# Patient Record
Sex: FEMALE | Race: WHITE | NOT HISPANIC OR LATINO | Employment: STUDENT | URBAN - METROPOLITAN AREA
[De-identification: names, ages, dates, MRNs, and addresses within clinical notes are randomized per-mention and may not be internally consistent; named-entity substitution may affect disease eponyms.]

---

## 2024-05-28 ENCOUNTER — TELEPHONE (OUTPATIENT)
Dept: OBSTETRICS AND GYNECOLOGY | Facility: CLINIC | Age: 19
End: 2024-05-28
Payer: MEDICAID

## 2024-05-28 NOTE — TELEPHONE ENCOUNTER
----- Message from Fran Russell sent at 5/28/2024 11:49 AM CDT -----  Contact: Pt  .Type:  Needs Medical Advice    Who Called: pt  Would the patient rather a call back or a response via [facility]?  Call back  Best Call Back Number: [phone]  Additional Information: pt. Would like to establish care with the provider.

## 2024-05-31 ENCOUNTER — TELEPHONE (OUTPATIENT)
Dept: OBSTETRICS AND GYNECOLOGY | Facility: CLINIC | Age: 19
End: 2024-05-31
Payer: MEDICAID

## 2024-05-31 NOTE — TELEPHONE ENCOUNTER
----- Message from Jennifer Painter sent at 5/28/2024  2:19 PM CDT -----  Type:  OB APPT     Who Called:np  Does the patient know what this is regarding?:est care for 1st ob visit  Pt is currently 10 weeks pregnant  Pt was seen at a parent planning center she did an US there (05/08/2024)  Last cycle 03/16/2024  Would the patient rather a call back or a response via [facility]? Call   Best Call Back Number: [phone]  Additional Information: Prefers a female and would like to deliver in [place]

## 2024-05-31 NOTE — TELEPHONE ENCOUNTER
Returned pt call and helped schedule with ob navigator next week since pt is about 10 weeks pregnant. Pt verbalized understanding.

## 2024-06-04 ENCOUNTER — PATIENT MESSAGE (OUTPATIENT)
Dept: OBSTETRICS AND GYNECOLOGY | Facility: CLINIC | Age: 19
End: 2024-06-04

## 2024-06-04 ENCOUNTER — CLINICAL SUPPORT (OUTPATIENT)
Dept: OBSTETRICS AND GYNECOLOGY | Facility: CLINIC | Age: 19
End: 2024-06-04
Payer: MEDICAID

## 2024-06-04 DIAGNOSIS — A74.9 CHLAMYDIA: Primary | ICD-10-CM

## 2024-06-04 DIAGNOSIS — N91.2 AMENORRHEA: Primary | ICD-10-CM

## 2024-06-04 PROCEDURE — 99211 OFF/OP EST MAY X REQ PHY/QHP: CPT | Mod: PBBFAC,PO

## 2024-06-04 PROCEDURE — 99999 PR PBB SHADOW E&M-EST. PATIENT-LVL I: CPT | Mod: PBBFAC,,,

## 2024-06-04 NOTE — PROGRESS NOTES
Spoke with patient for a total of 30 minutes during virtual visit.  Updated chart to reflect up to date patient demographics.  Allergies, medications, pharmacy, medical/family history and OB history updated.  Patient was guided through expectations of care during pregnancy.  Pregnancy confirmation, dating u/s & first routine OB appts scheduled.  Education provided & questions answered. Encouraged to send message or call office with any questions/concerns. Verbalized understanding.     Discussed with pt:    taking PNV   C/o occ n/v  C/o cramping L side/no spotting  Precautions discussed  Referred to [email or web address] for Preg A to Z guide & class schedule   Discussed benefits of breastfeeding  Discussed need for pediatrician  New pt-requested female, 1st avail  [facility] ~ 7wks with + cardiac activity  Asking about paternity testing

## 2024-06-05 ENCOUNTER — LAB VISIT (OUTPATIENT)
Dept: LAB | Facility: HOSPITAL | Age: 19
End: 2024-06-05
Attending: STUDENT IN AN ORGANIZED HEALTH CARE EDUCATION/TRAINING PROGRAM
Payer: MEDICAID

## 2024-06-05 ENCOUNTER — PROCEDURE VISIT (OUTPATIENT)
Dept: MATERNAL FETAL MEDICINE | Facility: CLINIC | Age: 19
End: 2024-06-05
Payer: MEDICAID

## 2024-06-05 ENCOUNTER — OFFICE VISIT (OUTPATIENT)
Dept: OBSTETRICS AND GYNECOLOGY | Facility: CLINIC | Age: 19
End: 2024-06-05
Payer: MEDICAID

## 2024-06-05 VITALS — HEART RATE: 102 BPM | WEIGHT: 112.88 LBS | DIASTOLIC BLOOD PRESSURE: 64 MMHG | SYSTOLIC BLOOD PRESSURE: 99 MMHG

## 2024-06-05 DIAGNOSIS — N91.2 AMENORRHEA: ICD-10-CM

## 2024-06-05 DIAGNOSIS — O21.9 NAUSEA AND VOMITING DURING PREGNANCY: ICD-10-CM

## 2024-06-05 DIAGNOSIS — Z3A.12 12 WEEKS GESTATION OF PREGNANCY: ICD-10-CM

## 2024-06-05 DIAGNOSIS — N91.2 AMENORRHEA: Primary | ICD-10-CM

## 2024-06-05 LAB
ABO + RH BLD: NORMAL
BASOPHILS # BLD AUTO: 0.04 K/UL (ref 0–0.2)
BASOPHILS NFR BLD: 0.6 % (ref 0–1.9)
BLD GP AB SCN CELLS X3 SERPL QL: NORMAL
DIFFERENTIAL METHOD BLD: ABNORMAL
EOSINOPHIL # BLD AUTO: 0.2 K/UL (ref 0–0.5)
EOSINOPHIL NFR BLD: 3.4 % (ref 0–8)
ERYTHROCYTE [DISTWIDTH] IN BLOOD BY AUTOMATED COUNT: 12.6 % (ref 11.5–14.5)
HBV SURFACE AG SERPL QL IA: NORMAL
HCT VFR BLD AUTO: 36.8 % (ref 37–48.5)
HCV AB SERPL QL IA: NORMAL
HGB BLD-MCNC: 12.3 G/DL (ref 12–16)
HIV 1+2 AB+HIV1 P24 AG SERPL QL IA: NORMAL
IMM GRANULOCYTES # BLD AUTO: 0.03 K/UL (ref 0–0.04)
IMM GRANULOCYTES NFR BLD AUTO: 0.5 % (ref 0–0.5)
LYMPHOCYTES # BLD AUTO: 2.3 K/UL (ref 1–4.8)
LYMPHOCYTES NFR BLD: 36.5 % (ref 18–48)
MCH RBC QN AUTO: 27.4 PG (ref 27–31)
MCHC RBC AUTO-ENTMCNC: 33.4 G/DL (ref 32–36)
MCV RBC AUTO: 82 FL (ref 82–98)
MONOCYTES # BLD AUTO: 0.6 K/UL (ref 0.3–1)
MONOCYTES NFR BLD: 9.7 % (ref 4–15)
NEUTROPHILS # BLD AUTO: 3 K/UL (ref 1.8–7.7)
NEUTROPHILS NFR BLD: 49.3 % (ref 38–73)
NRBC BLD-RTO: 0 /100 WBC
PLATELET # BLD AUTO: 198 K/UL (ref 150–450)
PMV BLD AUTO: 11 FL (ref 9.2–12.9)
RBC # BLD AUTO: 4.49 M/UL (ref 4–5.4)
SPECIMEN OUTDATE: NORMAL
TREPONEMA PALLIDUM IGG+IGM AB [PRESENCE] IN SERUM OR PLASMA BY IMMUNOASSAY: NONREACTIVE
WBC # BLD AUTO: 6.17 K/UL (ref 3.9–12.7)

## 2024-06-05 PROCEDURE — 87340 HEPATITIS B SURFACE AG IA: CPT | Performed by: STUDENT IN AN ORGANIZED HEALTH CARE EDUCATION/TRAINING PROGRAM

## 2024-06-05 PROCEDURE — 86901 BLOOD TYPING SEROLOGIC RH(D): CPT | Performed by: STUDENT IN AN ORGANIZED HEALTH CARE EDUCATION/TRAINING PROGRAM

## 2024-06-05 PROCEDURE — 87077 CULTURE AEROBIC IDENTIFY: CPT | Performed by: STUDENT IN AN ORGANIZED HEALTH CARE EDUCATION/TRAINING PROGRAM

## 2024-06-05 PROCEDURE — 81514 NFCT DS BV&VAGINITIS DNA ALG: CPT | Performed by: STUDENT IN AN ORGANIZED HEALTH CARE EDUCATION/TRAINING PROGRAM

## 2024-06-05 PROCEDURE — 81220 CFTR GENE COM VARIANTS: CPT | Performed by: STUDENT IN AN ORGANIZED HEALTH CARE EDUCATION/TRAINING PROGRAM

## 2024-06-05 PROCEDURE — 87389 HIV-1 AG W/HIV-1&-2 AB AG IA: CPT | Performed by: STUDENT IN AN ORGANIZED HEALTH CARE EDUCATION/TRAINING PROGRAM

## 2024-06-05 PROCEDURE — 99204 OFFICE O/P NEW MOD 45 MIN: CPT | Mod: S$PBB,TH,, | Performed by: STUDENT IN AN ORGANIZED HEALTH CARE EDUCATION/TRAINING PROGRAM

## 2024-06-05 PROCEDURE — 87491 CHLMYD TRACH DNA AMP PROBE: CPT | Performed by: STUDENT IN AN ORGANIZED HEALTH CARE EDUCATION/TRAINING PROGRAM

## 2024-06-05 PROCEDURE — 3078F DIAST BP <80 MM HG: CPT | Mod: CPTII,,, | Performed by: STUDENT IN AN ORGANIZED HEALTH CARE EDUCATION/TRAINING PROGRAM

## 2024-06-05 PROCEDURE — 87591 N.GONORRHOEAE DNA AMP PROB: CPT | Performed by: STUDENT IN AN ORGANIZED HEALTH CARE EDUCATION/TRAINING PROGRAM

## 2024-06-05 PROCEDURE — 83020 HEMOGLOBIN ELECTROPHORESIS: CPT | Performed by: STUDENT IN AN ORGANIZED HEALTH CARE EDUCATION/TRAINING PROGRAM

## 2024-06-05 PROCEDURE — 3074F SYST BP LT 130 MM HG: CPT | Mod: CPTII,,, | Performed by: STUDENT IN AN ORGANIZED HEALTH CARE EDUCATION/TRAINING PROGRAM

## 2024-06-05 PROCEDURE — 1159F MED LIST DOCD IN RCRD: CPT | Mod: CPTII,,, | Performed by: STUDENT IN AN ORGANIZED HEALTH CARE EDUCATION/TRAINING PROGRAM

## 2024-06-05 PROCEDURE — 87186 SC STD MICRODIL/AGAR DIL: CPT | Performed by: STUDENT IN AN ORGANIZED HEALTH CARE EDUCATION/TRAINING PROGRAM

## 2024-06-05 PROCEDURE — 99999 PR PBB SHADOW E&M-EST. PATIENT-LVL II: CPT | Mod: PBBFAC,,, | Performed by: STUDENT IN AN ORGANIZED HEALTH CARE EDUCATION/TRAINING PROGRAM

## 2024-06-05 PROCEDURE — 86593 SYPHILIS TEST NON-TREP QUANT: CPT | Performed by: STUDENT IN AN ORGANIZED HEALTH CARE EDUCATION/TRAINING PROGRAM

## 2024-06-05 PROCEDURE — 87088 URINE BACTERIA CULTURE: CPT | Performed by: STUDENT IN AN ORGANIZED HEALTH CARE EDUCATION/TRAINING PROGRAM

## 2024-06-05 PROCEDURE — 86762 RUBELLA ANTIBODY: CPT | Performed by: STUDENT IN AN ORGANIZED HEALTH CARE EDUCATION/TRAINING PROGRAM

## 2024-06-05 PROCEDURE — 99212 OFFICE O/P EST SF 10 MIN: CPT | Mod: PBBFAC,TH,PO,25 | Performed by: STUDENT IN AN ORGANIZED HEALTH CARE EDUCATION/TRAINING PROGRAM

## 2024-06-05 PROCEDURE — 86803 HEPATITIS C AB TEST: CPT | Performed by: STUDENT IN AN ORGANIZED HEALTH CARE EDUCATION/TRAINING PROGRAM

## 2024-06-05 PROCEDURE — 87086 URINE CULTURE/COLONY COUNT: CPT | Performed by: STUDENT IN AN ORGANIZED HEALTH CARE EDUCATION/TRAINING PROGRAM

## 2024-06-05 PROCEDURE — 36415 COLL VENOUS BLD VENIPUNCTURE: CPT | Performed by: STUDENT IN AN ORGANIZED HEALTH CARE EDUCATION/TRAINING PROGRAM

## 2024-06-05 PROCEDURE — 85025 COMPLETE CBC W/AUTO DIFF WBC: CPT | Performed by: STUDENT IN AN ORGANIZED HEALTH CARE EDUCATION/TRAINING PROGRAM

## 2024-06-05 PROCEDURE — 76801 OB US < 14 WKS SINGLE FETUS: CPT | Mod: PBBFAC,PO | Performed by: OBSTETRICS & GYNECOLOGY

## 2024-06-05 RX ORDER — ONDANSETRON 4 MG/1
4 TABLET, FILM COATED ORAL DAILY PRN
Qty: 30 TABLET | Refills: 1 | Status: SHIPPED | OUTPATIENT
Start: 2024-06-05 | End: 2025-06-05

## 2024-06-05 NOTE — PROGRESS NOTES
Reason for Visit   Routine Prenatal Visit    HPI   19 y.o. female  at 12w0d by Patient's last menstrual period was 2024 (exact date). and dating US today (GERI 24) presents for initial OB appointment. Patient feels well this pregnancy, reports no major issues/concerns.     Nausea:  Yes  Vomiting: No  Cramping: Yes  Bleeding:  No    Family history of bleeding disorders, birth defects, or mental disability: DENIES  Complications with prior pregnancy: N/A    Pap: No result found, <21  Allergies: Cat/feline products  OB History    Para Term  AB Living   1             SAB IAB Ectopic Multiple Live Births                  # Outcome Date GA Lbr Jean-Claude/2nd Weight Sex Type Anes PTL Lv   1 Current              History reviewed. No pertinent past medical history.   History reviewed. No pertinent surgical history.     ROS:  GENERAL: Denies weight gain or weight loss. Feeling well overall.   SKIN: Denies rash or lesions.   HEAD: Denies head injury or headache.   NODES: Denies enlarged lymph nodes.   CHEST: Denies chest pain or shortness of breath.   CARDIOVASCULAR: Denies palpitations or left sided chest pain.   ABDOMEN: No abdominal pain, constipation, diarrhea, nausea, vomiting or rectal bleeding.   URINARY: No frequency, dysuria, hematuria, or burning on urination.  REPRODUCTIVE: See HPI.   BREASTS: The patient performs breast self-examination and denies pain, lumps, or nipple discharge.   HEMATOLOGIC: No easy bruisability or excessive bleeding.  MUSCULOSKELETAL: Denies joint pain or swelling.   NEUROLOGIC: Denies syncope or weakness.   PSYCHIATRIC: Denies depression, anxiety or mood swings.      Exam   BP 99/64   Pulse 102   Wt 51.2 kg (112 lb 14 oz)   LMP 2024 (Exact Date)     Physical Exam:  APPEARANCE: Well nourished, well developed, in no acute distress.  AFFECT: WNL, alert and oriented x 3  SKIN: No acne or hirsutism  NECK: Neck symmetric without masses or thyromegaly  CHEST: Good  respiratory effect  PELVIC: Normal external genitalia without lesions.  Normal hair distribution.  Adequate perineal body, normal urethral meatus.    EXTREMITIES: No edema.    Assessment and Plan   Amenorrhea  -     Hepatitis C Antibody; Future; Expected date: 06/05/2024  -     HIV 1/2 Ag/Ab (4th Gen); Future; Expected date: 06/05/2024  -     Treponema Pallidium Antibodies IgG, IgM; Future; Expected date: 06/05/2024  -     Hepatitis B surface antigen; Future; Expected date: 06/05/2024  -     Type & Screen; Future; Expected date: 06/05/2024  -     Rubella antibody, IgG; Future; Expected date: 06/05/2024  -     Urine culture  -     CBC auto differential; Future; Expected date: 06/05/2024  -     US OB/GYN Procedure ([facility]); Future  -     C. trachomatis/N. gonorrhoeae by AMP DNA [facility]; Vagina  -     Hemoglobin Electrophoresis,Hgb A2 K[place]hal.; Future; Expected date: 06/05/2024  -     Cystic Fibrosis Mutation Panel; Future; Expected date: 06/05/2024  -     Vaginosis Screen by DNA Probe    12 weeks gestation of pregnancy        - GERI 12/18/24 by 12wk US  - Initial prenatal labs ordered   - Hgb electrophoresis, Carrier screening: ordered  - Aneuploidy screening: discussed options today, patient will consider  - PNV: taking   - ASA: low risk   - will start zofran for nausea     Patient was counseled today on:  - Routine prenatal blood tests including HIV and anticipated course of prenatal care  - Prenatal vitamins and folic acid  - Weight gain, nutrition, and exercise  - Foods to avoid in pregnancy (i.e. sushi, fish that are high in mercury, deli meat, and unpasteurized cheeses).   - Avoiding cat litter and raw meats due to risk of Toxoplasmosis   - Aneuploidy and neural tube screening: cfDNA vs integrated screening, AFP screen at 15 weeks  - Hgb electrophoresis and Carrier screening (CF, SMA) offered, fragile X as indicated by patient history   - OTC medication in the first trimester  - Harmful effects of smoking,  alcohol, and recreational drugs  - [facility] u/s for anatomy at 18-20 weeks.  - Seat belt use  - Pain, bleeding, and ED precautions given.  - All questions were answered          Return to clinic in 4 weeks          Stephanie Heaney, MD OBGYN [facility]

## 2024-06-06 LAB
HGB A2 MFR BLD HPLC: 3 % (ref 2.2–3.2)
HGB FRACT BLD ELPH-IMP: NORMAL
HGB FRACT BLD ELPH-IMP: NORMAL
RUBV IGG SER-ACNC: 34.1 IU/ML
RUBV IGG SER-IMP: REACTIVE

## 2024-06-07 LAB
BACTERIAL VAGINOSIS DNA: POSITIVE
CANDIDA GLABRATA DNA: NEGATIVE
CANDIDA KRUSEI DNA: NEGATIVE
CANDIDA RRNA VAG QL PROBE: NEGATIVE
T VAGINALIS RRNA GENITAL QL PROBE: NEGATIVE

## 2024-06-07 RX ORDER — METRONIDAZOLE 500 MG/1
500 TABLET ORAL EVERY 12 HOURS
Qty: 14 TABLET | Refills: 0 | Status: SHIPPED | OUTPATIENT
Start: 2024-06-07 | End: 2024-06-14

## 2024-06-08 LAB
BACTERIA UR CULT: ABNORMAL
C TRACH DNA SPEC QL NAA+PROBE: DETECTED
N GONORRHOEA DNA SPEC QL NAA+PROBE: NOT DETECTED

## 2024-06-10 DIAGNOSIS — B96.20 E. COLI UTI: Primary | ICD-10-CM

## 2024-06-10 DIAGNOSIS — N39.0 E. COLI UTI: Primary | ICD-10-CM

## 2024-06-10 RX ORDER — AMOXICILLIN AND CLAVULANATE POTASSIUM 875; 125 MG/1; MG/1
1 TABLET, FILM COATED ORAL EVERY 12 HOURS
Qty: 14 TABLET | Refills: 0 | Status: SHIPPED | OUTPATIENT
Start: 2024-06-10 | End: 2024-06-17

## 2024-06-11 ENCOUNTER — TELEPHONE (OUTPATIENT)
Dept: OBSTETRICS AND GYNECOLOGY | Facility: CLINIC | Age: 19
End: 2024-06-11
Payer: MEDICAID

## 2024-06-11 LAB — CFTR MUT ANL BLD/T: NORMAL

## 2024-06-11 RX ORDER — AZITHROMYCIN 500 MG/1
1000 TABLET, FILM COATED ORAL ONCE
Qty: 2 TABLET | Refills: 0 | Status: SHIPPED | OUTPATIENT
Start: 2024-06-11 | End: 2024-06-11

## 2024-06-11 NOTE — TELEPHONE ENCOUNTER
Called patient to review her medication request. Rx sent for positive chlamydia testing and reviewed other results (BV, UTI)

## 2024-06-17 ENCOUNTER — HOSPITAL ENCOUNTER (EMERGENCY)
Facility: HOSPITAL | Age: 19
Discharge: HOME | End: 2024-06-17
Attending: EMERGENCY MEDICINE
Payer: MEDICAID

## 2024-06-17 ENCOUNTER — E-CONSULT (OUTPATIENT)
Dept: OBSTETRICS AND GYNECOLOGY | Facility: CLINIC | Age: 19
End: 2024-06-17
Payer: MEDICAID

## 2024-06-17 VITALS
RESPIRATION RATE: 16 BRPM | BODY MASS INDEX: 21.14 KG/M2 | WEIGHT: 112 LBS | HEIGHT: 61 IN | DIASTOLIC BLOOD PRESSURE: 62 MMHG | SYSTOLIC BLOOD PRESSURE: 108 MMHG | HEART RATE: 78 BPM | OXYGEN SATURATION: 100 % | TEMPERATURE: 98 F

## 2024-06-17 DIAGNOSIS — I95.9 HYPOTENSION: ICD-10-CM

## 2024-06-17 DIAGNOSIS — I95.9 TRANSIENT HYPOTENSION: ICD-10-CM

## 2024-06-17 DIAGNOSIS — R10.9 ABDOMINAL CRAMPING: ICD-10-CM

## 2024-06-17 DIAGNOSIS — O03.9 COMPLETE MISCARRIAGE: Primary | ICD-10-CM

## 2024-06-17 DIAGNOSIS — O26.51 MATERNAL HYPOTENSION SYNDROME IN FIRST TRIMESTER: ICD-10-CM

## 2024-06-17 DIAGNOSIS — O03.9 SAB (SPONTANEOUS ABORTION): Primary | ICD-10-CM

## 2024-06-17 DIAGNOSIS — Z34.91 FIRST TRIMESTER PREGNANCY: ICD-10-CM

## 2024-06-17 LAB
ABO + RH BLD: NORMAL
ALBUMIN SERPL BCP-MCNC: 4.2 G/DL (ref 3.5–5.2)
ALLENS TEST: ABNORMAL
ALP SERPL-CCNC: 39 U/L (ref 55–135)
ALT SERPL W/O P-5'-P-CCNC: 24 U/L (ref 10–44)
ANION GAP SERPL CALC-SCNC: 14 MMOL/L (ref 8–16)
AST SERPL-CCNC: 29 U/L (ref 10–40)
B-HCG UR QL: POSITIVE
BASOPHILS # BLD AUTO: 0.04 K/UL (ref 0–0.2)
BASOPHILS # BLD AUTO: 0.06 K/UL (ref 0–0.2)
BASOPHILS NFR BLD: 0.4 % (ref 0–1.9)
BASOPHILS NFR BLD: 0.4 % (ref 0–1.9)
BILIRUB SERPL-MCNC: 0.3 MG/DL (ref 0.1–1)
BILIRUB UR QL STRIP: NEGATIVE
BUN SERPL-MCNC: 7 MG/DL (ref 6–20)
CALCIUM SERPL-MCNC: 9.9 MG/DL (ref 8.7–10.5)
CHLORIDE SERPL-SCNC: 104 MMOL/L (ref 95–110)
CLARITY UR REFRACT.AUTO: CLEAR
CO2 SERPL-SCNC: 18 MMOL/L (ref 23–29)
COLOR UR AUTO: YELLOW
CREAT SERPL-MCNC: 0.6 MG/DL (ref 0.5–1.4)
CTP QC/QA: YES
DIFFERENTIAL METHOD BLD: ABNORMAL
DIFFERENTIAL METHOD BLD: ABNORMAL
EOSINOPHIL # BLD AUTO: 0.2 K/UL (ref 0–0.5)
EOSINOPHIL # BLD AUTO: 0.3 K/UL (ref 0–0.5)
EOSINOPHIL NFR BLD: 1.5 % (ref 0–8)
EOSINOPHIL NFR BLD: 2.8 % (ref 0–8)
ERYTHROCYTE [DISTWIDTH] IN BLOOD BY AUTOMATED COUNT: 12.7 % (ref 11.5–14.5)
ERYTHROCYTE [DISTWIDTH] IN BLOOD BY AUTOMATED COUNT: 12.9 % (ref 11.5–14.5)
EST. GFR  (NO RACE VARIABLE): >60 ML/MIN/1.73 M^2
GLUCOSE SERPL-MCNC: 82 MG/DL (ref 70–110)
GLUCOSE UR QL STRIP: NEGATIVE
HCT VFR BLD AUTO: 25.2 % (ref 37–48.5)
HCT VFR BLD AUTO: 38 % (ref 37–48.5)
HGB BLD-MCNC: 12.4 G/DL (ref 12–16)
HGB BLD-MCNC: 8.2 G/DL (ref 12–16)
HGB UR QL STRIP: NEGATIVE
IMM GRANULOCYTES # BLD AUTO: 0.02 K/UL (ref 0–0.04)
IMM GRANULOCYTES # BLD AUTO: 0.05 K/UL (ref 0–0.04)
IMM GRANULOCYTES NFR BLD AUTO: 0.2 % (ref 0–0.5)
IMM GRANULOCYTES NFR BLD AUTO: 0.3 % (ref 0–0.5)
KETONES UR QL STRIP: ABNORMAL
LDH SERPL L TO P-CCNC: 0.36 MMOL/L (ref 0.5–2.2)
LEUKOCYTE ESTERASE UR QL STRIP: NEGATIVE
LIPASE SERPL-CCNC: 25 U/L (ref 4–60)
LYMPHOCYTES # BLD AUTO: 3 K/UL (ref 1–4.8)
LYMPHOCYTES # BLD AUTO: 3.3 K/UL (ref 1–4.8)
LYMPHOCYTES NFR BLD: 21.4 % (ref 18–48)
LYMPHOCYTES NFR BLD: 31.7 % (ref 18–48)
MCH RBC QN AUTO: 27.4 PG (ref 27–31)
MCH RBC QN AUTO: 27.4 PG (ref 27–31)
MCHC RBC AUTO-ENTMCNC: 32.5 G/DL (ref 32–36)
MCHC RBC AUTO-ENTMCNC: 32.6 G/DL (ref 32–36)
MCV RBC AUTO: 84 FL (ref 82–98)
MCV RBC AUTO: 84 FL (ref 82–98)
MONOCYTES # BLD AUTO: 0.6 K/UL (ref 0.3–1)
MONOCYTES # BLD AUTO: 0.8 K/UL (ref 0.3–1)
MONOCYTES NFR BLD: 5.2 % (ref 4–15)
MONOCYTES NFR BLD: 6.7 % (ref 4–15)
NEUTROPHILS # BLD AUTO: 11 K/UL (ref 1.8–7.7)
NEUTROPHILS # BLD AUTO: 5.5 K/UL (ref 1.8–7.7)
NEUTROPHILS NFR BLD: 58.2 % (ref 38–73)
NEUTROPHILS NFR BLD: 71.2 % (ref 38–73)
NITRITE UR QL STRIP: NEGATIVE
NRBC BLD-RTO: 0 /100 WBC
NRBC BLD-RTO: 0 /100 WBC
OHS QRS DURATION: 74 MS
OHS QTC CALCULATION: 435 MS
PH UR STRIP: 6 [PH] (ref 5–8)
PLATELET # BLD AUTO: 141 K/UL (ref 150–450)
PLATELET # BLD AUTO: 240 K/UL (ref 150–450)
PMV BLD AUTO: 10.9 FL (ref 9.2–12.9)
PMV BLD AUTO: 11.1 FL (ref 9.2–12.9)
POTASSIUM SERPL-SCNC: 3.6 MMOL/L (ref 3.5–5.1)
PROT SERPL-MCNC: 7.7 G/DL (ref 6–8.4)
PROT UR QL STRIP: NEGATIVE
RBC # BLD AUTO: 2.99 M/UL (ref 4–5.4)
RBC # BLD AUTO: 4.52 M/UL (ref 4–5.4)
SAMPLE: ABNORMAL
SITE: ABNORMAL
SODIUM SERPL-SCNC: 136 MMOL/L (ref 136–145)
SP GR UR STRIP: 1.02 (ref 1–1.03)
URN SPEC COLLECT METH UR: ABNORMAL
WBC # BLD AUTO: 15.52 K/UL (ref 3.9–12.7)
WBC # BLD AUTO: 9.43 K/UL (ref 3.9–12.7)

## 2024-06-17 PROCEDURE — 88300 SURGICAL PATH GROSS: CPT | Performed by: PATHOLOGY

## 2024-06-17 PROCEDURE — 96376 TX/PRO/DX INJ SAME DRUG ADON: CPT

## 2024-06-17 PROCEDURE — 93005 ELECTROCARDIOGRAM TRACING: CPT

## 2024-06-17 PROCEDURE — 99447 NTRPROF PH1/NTRNET/EHR 11-20: CPT | Mod: ,,, | Performed by: OBSTETRICS & GYNECOLOGY

## 2024-06-17 PROCEDURE — 25000003 PHARM REV CODE 250: Performed by: EMERGENCY MEDICINE

## 2024-06-17 PROCEDURE — 63600175 PHARM REV CODE 636 W HCPCS: Performed by: EMERGENCY MEDICINE

## 2024-06-17 PROCEDURE — 96375 TX/PRO/DX INJ NEW DRUG ADDON: CPT

## 2024-06-17 PROCEDURE — 81025 URINE PREGNANCY TEST: CPT | Performed by: EMERGENCY MEDICINE

## 2024-06-17 PROCEDURE — 93010 ELECTROCARDIOGRAM REPORT: CPT | Mod: ,,, | Performed by: INTERNAL MEDICINE

## 2024-06-17 PROCEDURE — 99285 EMERGENCY DEPT VISIT HI MDM: CPT | Mod: 25

## 2024-06-17 PROCEDURE — 96374 THER/PROPH/DIAG INJ IV PUSH: CPT

## 2024-06-17 PROCEDURE — 86900 BLOOD TYPING SEROLOGIC ABO: CPT | Performed by: EMERGENCY MEDICINE

## 2024-06-17 PROCEDURE — 99900035 HC TECH TIME PER 15 MIN (STAT)

## 2024-06-17 PROCEDURE — 88300 SURGICAL PATH GROSS: CPT | Mod: 26,,, | Performed by: PATHOLOGY

## 2024-06-17 PROCEDURE — 63700000 PHARM REV CODE 250 ALT 637 W/O HCPCS: Performed by: EMERGENCY MEDICINE

## 2024-06-17 PROCEDURE — 85025 COMPLETE CBC W/AUTO DIFF WBC: CPT | Performed by: EMERGENCY MEDICINE

## 2024-06-17 PROCEDURE — 85025 COMPLETE CBC W/AUTO DIFF WBC: CPT | Mod: 91 | Performed by: EMERGENCY MEDICINE

## 2024-06-17 PROCEDURE — 81003 URINALYSIS AUTO W/O SCOPE: CPT | Performed by: EMERGENCY MEDICINE

## 2024-06-17 PROCEDURE — 88305 TISSUE EXAM BY PATHOLOGIST: CPT | Mod: 26,,, | Performed by: PATHOLOGY

## 2024-06-17 PROCEDURE — 87040 BLOOD CULTURE FOR BACTERIA: CPT | Performed by: EMERGENCY MEDICINE

## 2024-06-17 PROCEDURE — 88305 TISSUE EXAM BY PATHOLOGIST: CPT | Performed by: PATHOLOGY

## 2024-06-17 PROCEDURE — 83690 ASSAY OF LIPASE: CPT | Performed by: EMERGENCY MEDICINE

## 2024-06-17 PROCEDURE — 96361 HYDRATE IV INFUSION ADD-ON: CPT

## 2024-06-17 PROCEDURE — 83605 ASSAY OF LACTIC ACID: CPT

## 2024-06-17 PROCEDURE — 86901 BLOOD TYPING SEROLOGIC RH(D): CPT | Performed by: EMERGENCY MEDICINE

## 2024-06-17 PROCEDURE — 80053 COMPREHEN METABOLIC PANEL: CPT | Performed by: EMERGENCY MEDICINE

## 2024-06-17 RX ORDER — ONDANSETRON 4 MG/1
4 TABLET, ORALLY DISINTEGRATING ORAL EVERY 6 HOURS PRN
Qty: 12 TABLET | Refills: 0 | Status: SHIPPED | OUTPATIENT
Start: 2024-06-17 | End: 2024-06-20

## 2024-06-17 RX ORDER — ONDANSETRON HYDROCHLORIDE 2 MG/ML
4 INJECTION, SOLUTION INTRAVENOUS
Status: COMPLETED | OUTPATIENT
Start: 2024-06-17 | End: 2024-06-17

## 2024-06-17 RX ORDER — HYDROCODONE BITARTRATE AND ACETAMINOPHEN 500; 5 MG/1; MG/1
TABLET ORAL
Status: DISCONTINUED | OUTPATIENT
Start: 2024-06-17 | End: 2024-06-17 | Stop reason: HOSPADM

## 2024-06-17 RX ORDER — MORPHINE SULFATE 4 MG/ML
4 INJECTION, SOLUTION INTRAMUSCULAR; INTRAVENOUS
Status: COMPLETED | OUTPATIENT
Start: 2024-06-17 | End: 2024-06-17

## 2024-06-17 RX ORDER — MORPHINE SULFATE 2 MG/ML
5 INJECTION, SOLUTION INTRAMUSCULAR; INTRAVENOUS
Status: COMPLETED | OUTPATIENT
Start: 2024-06-17 | End: 2024-06-17

## 2024-06-17 RX ORDER — HYDROCODONE BITARTRATE AND ACETAMINOPHEN 5; 325 MG/1; MG/1
1 TABLET ORAL EVERY 6 HOURS PRN
Qty: 12 TABLET | Refills: 0 | Status: SHIPPED | OUTPATIENT
Start: 2024-06-17 | End: 2024-06-20

## 2024-06-17 RX ORDER — AZITHROMYCIN 250 MG/1
1000 TABLET, FILM COATED ORAL
Status: COMPLETED | OUTPATIENT
Start: 2024-06-17 | End: 2024-06-17

## 2024-06-17 RX ORDER — MORPHINE SULFATE 2 MG/ML
2 INJECTION, SOLUTION INTRAMUSCULAR; INTRAVENOUS
Status: COMPLETED | OUTPATIENT
Start: 2024-06-17 | End: 2024-06-17

## 2024-06-17 RX ORDER — METOCLOPRAMIDE HYDROCHLORIDE 5 MG/ML
5 INJECTION INTRAMUSCULAR; INTRAVENOUS
Status: COMPLETED | OUTPATIENT
Start: 2024-06-17 | End: 2024-06-17

## 2024-06-17 RX ADMIN — METOCLOPRAMIDE 5 MG: 5 INJECTION, SOLUTION INTRAMUSCULAR; INTRAVENOUS at 03:06

## 2024-06-17 RX ADMIN — SODIUM CHLORIDE 1000 ML: 9 INJECTION, SOLUTION INTRAVENOUS at 10:06

## 2024-06-17 RX ADMIN — MORPHINE SULFATE 2 MG: 2 INJECTION, SOLUTION INTRAMUSCULAR; INTRAVENOUS at 03:06

## 2024-06-17 RX ADMIN — ONDANSETRON 4 MG: 2 INJECTION INTRAMUSCULAR; INTRAVENOUS at 07:06

## 2024-06-17 RX ADMIN — SODIUM CHLORIDE 1000 ML: 9 INJECTION, SOLUTION INTRAVENOUS at 03:06

## 2024-06-17 RX ADMIN — MORPHINE SULFATE 4 MG: 4 INJECTION INTRAVENOUS at 05:06

## 2024-06-17 RX ADMIN — ONDANSETRON 4 MG: 2 INJECTION INTRAMUSCULAR; INTRAVENOUS at 05:06

## 2024-06-17 RX ADMIN — MORPHINE SULFATE 5 MG: 2 INJECTION, SOLUTION INTRAMUSCULAR; INTRAVENOUS at 07:06

## 2024-06-17 RX ADMIN — AZITHROMYCIN DIHYDRATE 1000 MG: 250 TABLET ORAL at 02:06

## 2024-06-17 NOTE — ED NOTES
Patient identifiers for [facility] checked and correct.    LOC: The patient is awake, alert and aware of environment with an appropriate affect, the patient is oriented x 4 and speaking appropriately.  APPEARANCE: Patient resting comfortably and in no acute distress, patient is clean and well groomed, patient's clothing is properly fastened.  SKIN: The skin is warm and dry, color consistent with ethnicity, patient has normal skin turgor and moist mucus membranes, skin intact, no breakdown or bruising noted.  MUSCULOSKELETAL: Patient moving all extremities well, no obvious swelling or deformities noted.  RESPIRATORY: Airway is open and patent, respirations are spontaneous and even, patient has a normal effort and rate.  CARDIAC: Patient has a normal rhythm, no periphreal edema noted, capillary refill < 3 seconds. +tachycardia  ABDOMEN: Soft to palpation, no distention noted. Patient denies any nausea, vomiting, diarrhea, or constipation. +abdominal pain/cramping  NEUROLOGIC: Eyes open spontaneously, PERRL, behavior appropriate to situation, follows commands, facial expression symmetrical, bilateral hand grasp equal and even, purposeful motor response noted, normal sensation in all extremities.   HEENT: No abnormalities noted. White sclera and pupils equal round and reactive to light. Denies headache, dizziness.   : Pt voids independently, denies dysuria, hematuria, frequency.

## 2024-06-17 NOTE — ED PROVIDER NOTES
Source of History:  Patient  Chart    Chief complaint:  Abdominal Cramping (Abdominal cramps started yesterday, states being 13 weeks pregnant. Denies any bleeding or dysuria. )      HPI:  Hema Riley is a 19 y.o. female  at 13 week 2 day gestational age presenting to emergency department with complaint of abdominal cramping.    Per chart review, patient has LMP was 2024.  She was seen by Obstetrics within the last 2 weeks and had a transvaginal ultrasound that demonstrated in an intrauterine pregnancy. Her obstetrician as at [facility].      Patient presented to outside hospital, [facility], earlier this evening.  She complained of 1 episode of emesis as well as abdominal pain.  That note states that she had a urinalysis done 6/10, urine culture grew out E coli for which she was prescribed Augmentin.  She had a bedside ultrasound at outside hospital with a fetal heart rate of 176 today.  Her urinalysis showed cloudy urine.  Apparently, patient had not yet started taking her Augmentin.  She was given an additional paper prescription for this.    Patient states that she has ongoing severe crampy pain since she left the outside hospital about an hour ago.  She denies vaginal bleeding.  No fevers.  She is nauseous but not vomiting.  She took 2 500 mg Tylenol about 3 hours ago.     Patient also states that she was diagnosed with chlamydia by her obstetrician, but she has not started taking medication for this.  She complains of 2 days of vaginal discharge. She also states that she was told that she has bacterial vaginosis for which she is supposed to be taking metronidazole.    Review of patient's allergies indicates:   Allergen Reactions    Cat/feline products Hives       No current facility-administered medications on file prior to encounter.     Current Outpatient Medications on File Prior to Encounter   Medication Sig Dispense Refill    amoxicillin-clavulanate 875-125mg (AUGMENTIN) 875-125 mg  per tablet Take 1 tablet by mouth every 12 (twelve) hours. for 7 days 14 tablet 0    ondansetron (ZOFRAN) 4 MG tablet Take 1 tablet (4 mg total) by mouth daily as needed for Nausea. 30 tablet 1    prenatal vit no.124/iron/folic (PRENATAL VITAMIN ORAL) Take 1 Dose by mouth Daily.         PMH:  As per HPI and below:  History reviewed. No pertinent past medical history.  History reviewed. No pertinent surgical history.    Social History     Socioeconomic History    Marital status:    Tobacco Use    Smoking status: Never    Smokeless tobacco: Never   Substance and Sexual Activity    Alcohol use: Never    Drug use: Not Currently     Types: Marijuana     Comment: last used 06/2023 per pt    Sexual activity: Yes     Partners: Male       No family history on file.    Physical Exam:      Vitals:    06/17/24 0300   BP:    Pulse:    Resp: 16   Temp:      Gen:   Uncomfortable, hunched forward in the stretcher  Mental Status:  Alert and oriented .  Appropriate, conversant.  Skin: Warm, dry. No rashes seen.  Eyes: No conjunctival injection.  Pulm: CTAB. No increased work of breathing.  No significant tachypnea.  No audible stridor or wheezing.  No conversational dyspnea.    CV: Regular rate. Regular rhythm.   Abd: Soft.  Not distended.  Tenderness to palpation of the right lower quadrant without rebound tenderness or guarding.  MSK: No CVA tenderness bilaterally  Neuro: Awake. Speech normal. No focal neuro deficit observed.      Laboratory Studies:  Labs Reviewed   CBC W/ AUTO DIFFERENTIAL - Abnormal; Notable for the following components:       Result Value    WBC 15.52 (*)     Gran # (ANC) 11.0 (*)     Immature Grans (Abs) 0.05 (*)     All other components within normal limits   COMPREHENSIVE METABOLIC PANEL - Abnormal; Notable for the following components:    CO2 18 (*)     Alkaline Phosphatase 39 (*)     All other components within normal limits   POCT URINE PREGNANCY - Abnormal; Notable for the following components:     POC Preg Test, Ur Positive (*)     All other components within normal limits   LIPASE   URINALYSIS, REFLEX TO URINE CULTURE   GROUP & RH       Chart reviewed.     Imaging Results              US OB <14 Wks, TransAbd, Single Gestation (In process)  Result time 06/17/24 04:20:24   Procedure changed from US OB 14+ Wks, TransAbd, Single Gestation                   Medications Given:  Medications   morphine injection 4 mg (has no administration in time range)   ondansetron injection 4 mg (has no administration in time range)   sodium chloride 0.9% bolus 1,000 mL 1,000 mL (0 mLs Intravenous Stopped 6/17/24 0439)   morphine injection 2 mg (2 mg Intravenous Given 6/17/24 0300)   metoclopramide injection 5 mg (5 mg Intravenous Given 6/17/24 0300)   azithromycin tablet 1,000 mg (1,000 mg Oral Given 6/17/24 0259)       MDM:    19 y.o. female with   Complaint of low abdominal pain in early pregnancy, in the setting of untreated urinary tract infection and chlamydia.  She was afebrile and hemodynamically stable.  Will place IV, give fluids, nausea medication, pain medication.  Will treat her chlamydia.  She apparently received 1 dose of antibiotic for her urinary tract infection at [facility] prior to being discharged.      Will obtain labs, ultrasound.    Her labs were reviewed.  No leukocytosis or acute anemia.  CMP remarkable for a bicarb of 18.   Rh positive.    I was contacted by the ultrasonographer,  her pregnancy no longer has a detectable heart rate and she appears to be about to miscarry/ passed the pregnancy.  A transvaginal ultrasound was not done.  They were able to visualize the ovaries.  Patient will be sent back down from ultrasound    5:02 AM    Patient reassessed after ultrasound.  We discussed prelim ultrasound results.  She complains of vomiting and clear fluid leaking from her vagina.  Still no vaginal bleeding.  Abdominal pain is somewhat improved.  Will give morphine, Zofran.     Confirmed that she  actually has her prescriptions for metronidazole, Augmentin, and treatment for chlamydia at home.    Signed out to oncoming physician pending formal ultrasound read for final disposition    Diagnostic Impression:    1. Incomplete miscarriage    2. Abdominal cramping    3. First trimester pregnancy         ED Disposition Condition    Discharge Stable          ED Prescriptions       Medication Sig Dispense Start Date End Date Auth. Provider    HYDROcodone-acetaminophen (NORCO) 5-325 mg per tablet Take 1 tablet by mouth every 6 (six) hours as needed for Pain. 12 tablet 6/17/2024 6/20/2024 Nazanin Bello MD    ondansetron (ZOFRAN-ODT) 4 MG TbDL Take 1 tablet (4 mg total) by mouth every 6 (six) hours as needed. 12 tablet 6/17/2024 6/20/2024 Nazanin Bello MD          Follow-up Information       Follow up With Specialties Details Why Contact Info Additional Information    Elizabeth Guadarrama MD Pediatrics Schedule an appointment as soon as possible for a visit   [address]  [place] [address]  [phone]       [facility] Obstetrics and Gynecology Schedule an appointment as soon as possible for a visit   [address] [address] [phone] Turn at Entrance 1 on [facility] in [facility] [place] and take elevators to Floor 2. Follow signs to [facility]. Take [place] Elevators to Floor 4 for [address].            Patient understands the plan and is in agreement, verbalized understanding, questions answered    Nazanin Bello MD  Emergency Medicine         Nazanin Bello MD  06/18/24 9635

## 2024-06-17 NOTE — DISCHARGE INSTRUCTIONS
Tests today showed:   Labs Reviewed   CBC W/ AUTO DIFFERENTIAL - Abnormal; Notable for the following components:       Result Value    WBC 15.52 (*)     Gran # (ANC) 11.0 (*)     Immature Grans (Abs) 0.05 (*)     All other components within normal limits   COMPREHENSIVE METABOLIC PANEL - Abnormal; Notable for the following components:    CO2 18 (*)     Alkaline Phosphatase 39 (*)     All other components within normal limits   POCT URINE PREGNANCY - Abnormal; Notable for the following components:    POC Preg Test, Ur Positive (*)     All other components within normal limits   LIPASE   URINALYSIS, REFLEX TO URINE CULTURE   GROUP & RH     US OB <14 Wks, TransAbd, Single Gestation    (Results Pending)       Treatments you had today:   Medications   morphine injection 4 mg (has no administration in time range)   ondansetron injection 4 mg (has no administration in time range)   sodium chloride 0.9% bolus 1,000 mL 1,000 mL (0 mLs Intravenous Stopped 6/17/24 0439)   morphine injection 2 mg (2 mg Intravenous Given 6/17/24 0300)   metoclopramide injection 5 mg (5 mg Intravenous Given 6/17/24 0300)   azithromycin tablet 1,000 mg (1,000 mg Oral Given 6/17/24 0259)       Follow-Up Plan:  - Follow-up with OB doctor within 5 days  - Additional testing and/or evaluation as directed by your OB    Return to the Emergency Department for symptoms including but not limited to: worsening symptoms, shortness of breath or chest pain, vomiting with inability to hold down fluids, fevers greater than 100.4°F, passing out/fainting/unconsciousness, or other concerning symptoms.

## 2024-06-17 NOTE — CONSULTS
[facility]  Response for E-Consult     Patient Name: Hema Riley  MRN: [id number]  Primary Care Provider: Elizabeth Guadarrama MD   Requesting Provider: Chava Montemayor MD  Consults    Recommendation: Cytotec 400mcg q6h x 4 doses after the SAB    Contingency that warrants a repeat eConsult or referral: continued bleeding.    Total time of Consultation: 15 minute    I did speak to the requesting provider verbally about this.  Discussed the SAB and continued slight hypotension with mild orthostatic sx.  ED staff was likely going to transfuse pt 1 u PRBC.  But no active bleeding since passing all products of conception.    *This eConsult is based on the clinical data available to me and is furnished without benefit of a physical examination. The eConsult will need to be interpreted in light of any clinical issues or changes in patient status not available to me at the time of filing this eConsults. Significant changes in patient condition or level of acuity should result in immediate formal consultation and reevaluation. Please alert me if you have further questions.    Thank you for this eConsult referral.     Ji Fletcher MD  [facility]

## 2024-06-17 NOTE — PROVIDER PROGRESS NOTES - EMERGENCY DEPT.
Encounter Date: 6/17/2024    ED Physician Progress Notes          STAFF PHYSICIAN F/U NOTE:  Hema Riley has been evaluated and treated. S/O  pending ultrasound and discharge.   On my evaluation, however she was having increased bleeding and abdominal pain leading to analgesia and performing of a pelvic exam to assure no obstruction of cervical os as a cause to her increased bleeding.   On initial inspection, and speculum placement,  spontaneous passage of fetus and products of conception occurred.   Will perform transvaginal ultrasounds to assure no  retained products of conception  that may lead to infection or complications.   After evacuation, complete resolution of her pain.  ____________________  Rolan Montemayor MD, [facility]  Emergency Medicine Staff  9:27 AM 6/17/2024    F/U: After IVF, patient's BP improved. Ambulated patient around ED and walked to restroom with no dizziness, dyspnea, GUTHRIE, HA, or any Sx. Patient eating fried chicken (brittney PO). Discussed benefit:harm or 1U PRB, but she refused; she acknowledge if any bleeding or Sx, she'd return to the ED. Discussed with sig other and mom Sx warranting ED return like fever, abd pain, vomiting, lightheadedness, passing out, any concern to return to ED.    Vitals:    06/17/24 1332 06/17/24 1403 06/17/24 1439 06/17/24 1441   BP: (!) 102/58 (!) 95/58 (!) 95/58 108/62   BP Location:   Right arm Right arm   Patient Position:   Lying Standing   Pulse: 80 95  78   Resp: 19 (!) 21 20 16   Temp:   98.2 °F (36.8 °C)    TempSrc:   Oral    SpO2: 100% 100% 100% 100%   Weight:       Height:         ____________________  Rolan Montemayor MD, [facility]  Emergency Medicine Staff  2:41 PM 6/17/2024

## 2024-06-17 NOTE — ED NOTES
Patient identifiers for Hema Riley 19 y.o. female checked and correct.  Chief Complaint   Patient presents with    Abdominal Cramping     Abdominal cramps started yesterday, states being 13 weeks pregnant. Denies any bleeding or dysuria.      History reviewed. No pertinent past medical history.  Allergies reported:   Review of patient's allergies indicates:   Allergen Reactions    Cat/feline products Hives       Appearance: Pt awake, alert & oriented to person, place & time. Pt in no acute distress at present time. Pt is clean and well groomed with clothes appropriately fastened.   Skin: Skin warm, dry & intact. Color consistent with ethnicity. Mucous membranes moist. No breakdown or brusing noted.   Musculoskeletal: Patient moving all extremities well, no obvious swelling or deformities noted.   Respiratory: Respirations spontaneous, even, and non-labored. Visible chest rise noted. Airway is open and patent. No accessory muscle use noted.   Neurologic: Sensation is intact. Speech is clear and appropriate. Eyes open spontaneously, behavior appropriate to situation, follows commands, facial expression symmetrical, bilateral hand grasp equal and even, purposeful motor response noted.  Cardiac: All peripheral pulses present. No Bilateral lower extremity edema. Cap refill is <3 seconds.  Abdomen: Abdomen soft, non distended, non tender to palpation. Painful cramping   : Pt voids independently, denies dysuria, hematuria, frequency.

## 2024-06-18 ENCOUNTER — TELEPHONE (OUTPATIENT)
Dept: EMERGENCY MEDICINE | Facility: HOSPITAL | Age: 19
End: 2024-06-18
Payer: MEDICAID

## 2024-06-18 DIAGNOSIS — O03.9 COMPLETE MISCARRIAGE: Primary | ICD-10-CM

## 2024-06-18 RX ORDER — MISOPROSTOL 200 UG/1
400 TABLET ORAL EVERY 6 HOURS
Qty: 8 TABLET | Refills: 0 | Status: SHIPPED | OUTPATIENT
Start: 2024-06-18 | End: 2024-06-19

## 2024-06-18 NOTE — TELEPHONE ENCOUNTER
Patient is saying had a miscarriage yesterday and would like to see you sooner than 7/8/24.     Please advise!

## 2024-06-18 NOTE — TELEPHONE ENCOUNTER
I called patient's in regards to my disposition yesterday.  Patient reports feeling much better.  No nausea or vomiting.  She has had scant vaginal bleeding with no crampy lower abdominal pain.  She denies any urinary symptoms.  We discussed use of Cytotec 400 mcg every 6 hours x4 doses to assure no retainment of products of conception after yesterday spontaneous .  ____________________  Rolan Montemayor MD, [facility]  Emergency Medicine Staff  3:21 PM 2024

## 2024-06-19 LAB
FINAL PATHOLOGIC DIAGNOSIS: NORMAL
GROSS: NORMAL
Lab: NORMAL

## 2024-06-22 LAB
BACTERIA BLD CULT: NORMAL
BACTERIA BLD CULT: NORMAL

## 2024-06-24 ENCOUNTER — LAB VISIT (OUTPATIENT)
Dept: LAB | Facility: HOSPITAL | Age: 19
End: 2024-06-24
Attending: STUDENT IN AN ORGANIZED HEALTH CARE EDUCATION/TRAINING PROGRAM
Payer: MEDICAID

## 2024-06-24 ENCOUNTER — OFFICE VISIT (OUTPATIENT)
Dept: OBSTETRICS AND GYNECOLOGY | Facility: CLINIC | Age: 19
End: 2024-06-24
Payer: MEDICAID

## 2024-06-24 VITALS
SYSTOLIC BLOOD PRESSURE: 96 MMHG | BODY MASS INDEX: 20.7 KG/M2 | DIASTOLIC BLOOD PRESSURE: 62 MMHG | HEART RATE: 99 BPM | WEIGHT: 109.56 LBS

## 2024-06-24 DIAGNOSIS — Z30.09 ENCOUNTER FOR COUNSELING REGARDING CONTRACEPTION: ICD-10-CM

## 2024-06-24 DIAGNOSIS — O03.9 MISCARRIAGE: Primary | ICD-10-CM

## 2024-06-24 DIAGNOSIS — O03.9 MISCARRIAGE: ICD-10-CM

## 2024-06-24 LAB
ERYTHROCYTE [DISTWIDTH] IN BLOOD BY AUTOMATED COUNT: 13.3 % (ref 11.5–14.5)
HCG INTACT+B SERPL-ACNC: 118 MIU/ML
HCT VFR BLD AUTO: 36.2 % (ref 37–48.5)
HGB BLD-MCNC: 11.8 G/DL (ref 12–16)
MCH RBC QN AUTO: 27.7 PG (ref 27–31)
MCHC RBC AUTO-ENTMCNC: 32.6 G/DL (ref 32–36)
MCV RBC AUTO: 85 FL (ref 82–98)
PLATELET # BLD AUTO: 273 K/UL (ref 150–450)
PMV BLD AUTO: 11 FL (ref 9.2–12.9)
RBC # BLD AUTO: 4.26 M/UL (ref 4–5.4)
WBC # BLD AUTO: 11.73 K/UL (ref 3.9–12.7)

## 2024-06-24 PROCEDURE — 1159F MED LIST DOCD IN RCRD: CPT | Mod: CPTII,,, | Performed by: STUDENT IN AN ORGANIZED HEALTH CARE EDUCATION/TRAINING PROGRAM

## 2024-06-24 PROCEDURE — 3078F DIAST BP <80 MM HG: CPT | Mod: CPTII,,, | Performed by: STUDENT IN AN ORGANIZED HEALTH CARE EDUCATION/TRAINING PROGRAM

## 2024-06-24 PROCEDURE — 3074F SYST BP LT 130 MM HG: CPT | Mod: CPTII,,, | Performed by: STUDENT IN AN ORGANIZED HEALTH CARE EDUCATION/TRAINING PROGRAM

## 2024-06-24 PROCEDURE — 99214 OFFICE O/P EST MOD 30 MIN: CPT | Mod: S$PBB,TH,, | Performed by: STUDENT IN AN ORGANIZED HEALTH CARE EDUCATION/TRAINING PROGRAM

## 2024-06-24 PROCEDURE — 84702 CHORIONIC GONADOTROPIN TEST: CPT | Performed by: STUDENT IN AN ORGANIZED HEALTH CARE EDUCATION/TRAINING PROGRAM

## 2024-06-24 PROCEDURE — 99212 OFFICE O/P EST SF 10 MIN: CPT | Mod: PBBFAC,TH,PO | Performed by: STUDENT IN AN ORGANIZED HEALTH CARE EDUCATION/TRAINING PROGRAM

## 2024-06-24 PROCEDURE — 85027 COMPLETE CBC AUTOMATED: CPT | Performed by: STUDENT IN AN ORGANIZED HEALTH CARE EDUCATION/TRAINING PROGRAM

## 2024-06-24 PROCEDURE — 3008F BODY MASS INDEX DOCD: CPT | Mod: CPTII,,, | Performed by: STUDENT IN AN ORGANIZED HEALTH CARE EDUCATION/TRAINING PROGRAM

## 2024-06-24 PROCEDURE — 99999 PR PBB SHADOW E&M-EST. PATIENT-LVL II: CPT | Mod: PBBFAC,,, | Performed by: STUDENT IN AN ORGANIZED HEALTH CARE EDUCATION/TRAINING PROGRAM

## 2024-06-24 PROCEDURE — 36415 COLL VENOUS BLD VENIPUNCTURE: CPT | Performed by: STUDENT IN AN ORGANIZED HEALTH CARE EDUCATION/TRAINING PROGRAM

## 2024-06-24 RX ORDER — NORELGESTROMIN AND ETHINYL ESTRADIOL 35; 150 UG/MG; UG/MG
1 PATCH TRANSDERMAL
Qty: 12 PATCH | Refills: 3 | Status: SHIPPED | OUTPATIENT
Start: 2024-06-24 | End: 2025-06-24

## 2024-06-24 NOTE — PROGRESS NOTES
CC: Follow-up    HPI:  Hema Riley is a 19 y.o. female  presents for follow up after miscarriage.     had pain/bleeding, went to ED US x2 first with RAUL/cervix gestational sac, then with incomplete miscarriage    took cytotec 400 q6hr     Having some bleeding today but improving. Did pass a larger clot yesterday. No cramping    ROS:  GENERAL: No fever, chills, fatigability or weight loss.  VULVAR: No pain, no lesions and no itching.  VAGINAL: No relaxation, no itching, no discharge, and no lesions.  ABDOMEN: No abdominal pain. Denies nausea. Denies vomiting. No diarrhea. No constipation  BREAST: Denies pain. No lumps. No discharge.  URINARY: No incontinence, no nocturia, no frequency and no dysuria.  CARDIOVASCULAR: No chest pain. No shortness of breath. No leg cramps.  NEUROLOGICAL: No headaches. No vision changes.      Patient History:  History reviewed. No pertinent past medical history.  History reviewed. No pertinent surgical history.  Social History     Tobacco Use    Smoking status: Never    Smokeless tobacco: Never   Substance Use Topics    Alcohol use: Never    Drug use: Not Currently     Types: Marijuana     Comment: last used 2023 per pt     No family history on file.  OB History    Para Term  AB Living   1             SAB IAB Ectopic Multiple Live Births                  # Outcome Date GA Lbr Jean-Claude/2nd Weight Sex Type Anes PTL Lv   1 Current                Objective:   BP 96/62   Pulse 99   Wt 49.7 kg (109 lb 9.1 oz)   LMP 2024 (Exact Date)   BMI 20.70 kg/m²   Patient's last menstrual period was 2024 (exact date).      PHYSICAL EXAM:  APPEARANCE: Well nourished, well developed, in no acute distress.  AFFECT: WNL, alert and oriented x 3  SKIN: No acne or hirsutism  NECK: Neck symmetric without masses or thyromegaly  CHEST: Good respiratory effect  EXTREMITIES: No edema.      ASSESSMENT and PLAN:    ICD-10-CM ICD-9-CM    1. Miscarriage  O03.9 634.90 CBC Without  Differential      HCG, QUANTITATIVE, PREGNANCY      2. Encounter for counseling regarding contraception  Z30.09 V25.09 norelgestromin-ethinyl estradiol 150-35 mcg/24 hr          Miscarriage  - reviewed course of miscarriage, labs and imaging with patient. Counseled that the miscarriage was not due to any choices or any fault of her own, nothing she could have done different to prevent miscarriage. All patient questions answered.   - MBT Rh pos, no rhogam indicated   - CBC and HCG level today   - counseled patient ovulation can resume within days of EPL, offered contraception to prevent pregnancy if patient desires. Patient desires patches, rx sent to pharmacy   - discussed limited evidence/no high quality data for abstaining from intercourse/pelvic rest to prevent infection, timing of pregnancy after miscarriage  - reviewed if patient experiences recurrent miscarriage, further evaluation can be completed to help determine causes, risk reduction strategies in future   - encouraged patient to continue prenatal vitamins        Follow up: as needed if symptoms worsen or abnormal lab work      Stephanie Heaney, MD OBGYN [facility]

## 2024-06-26 DIAGNOSIS — O03.9 MISCARRIAGE: Primary | ICD-10-CM

## 2024-07-08 ENCOUNTER — TELEPHONE (OUTPATIENT)
Dept: OBSTETRICS AND GYNECOLOGY | Facility: CLINIC | Age: 19
End: 2024-07-08
Payer: MEDICAID

## 2024-07-08 ENCOUNTER — ROUTINE PRENATAL (OUTPATIENT)
Dept: OBSTETRICS AND GYNECOLOGY | Facility: CLINIC | Age: 19
End: 2024-07-08
Payer: MEDICAID

## 2024-07-08 DIAGNOSIS — Z30.42 DEPO-PROVERA CONTRACEPTIVE STATUS: Primary | ICD-10-CM

## 2024-07-08 DIAGNOSIS — A74.9 CHLAMYDIA: ICD-10-CM

## 2024-07-08 DIAGNOSIS — R30.0 DYSURIA: ICD-10-CM

## 2024-07-08 PROCEDURE — 87491 CHLMYD TRACH DNA AMP PROBE: CPT | Performed by: STUDENT IN AN ORGANIZED HEALTH CARE EDUCATION/TRAINING PROGRAM

## 2024-07-08 PROCEDURE — 87086 URINE CULTURE/COLONY COUNT: CPT | Performed by: STUDENT IN AN ORGANIZED HEALTH CARE EDUCATION/TRAINING PROGRAM

## 2024-07-08 PROCEDURE — 99999PBSHW PR PBB SHADOW TECHNICAL ONLY FILED TO HB: Mod: PBBFAC,,,

## 2024-07-08 PROCEDURE — 99212 OFFICE O/P EST SF 10 MIN: CPT | Mod: PBBFAC,PO | Performed by: STUDENT IN AN ORGANIZED HEALTH CARE EDUCATION/TRAINING PROGRAM

## 2024-07-08 PROCEDURE — 99999 PR PBB SHADOW E&M-EST. PATIENT-LVL II: CPT | Mod: PBBFAC,,, | Performed by: STUDENT IN AN ORGANIZED HEALTH CARE EDUCATION/TRAINING PROGRAM

## 2024-07-08 PROCEDURE — 87591 N.GONORRHOEAE DNA AMP PROB: CPT | Performed by: STUDENT IN AN ORGANIZED HEALTH CARE EDUCATION/TRAINING PROGRAM

## 2024-07-08 PROCEDURE — 99214 OFFICE O/P EST MOD 30 MIN: CPT | Mod: S$PBB,,, | Performed by: STUDENT IN AN ORGANIZED HEALTH CARE EDUCATION/TRAINING PROGRAM

## 2024-07-08 RX ORDER — MEDROXYPROGESTERONE ACETATE 150 MG/ML
150 INJECTION, SUSPENSION INTRAMUSCULAR
Status: COMPLETED | OUTPATIENT
Start: 2024-07-08 | End: 2024-07-08

## 2024-07-08 RX ADMIN — MEDROXYPROGESTERONE ACETATE 150 MG: 150 INJECTION, SUSPENSION INTRAMUSCULAR at 03:07

## 2024-07-08 NOTE — PROGRESS NOTES
CC: repeat STI testing, depo shot    HPI:  Hema Riley is a 19 y.o. female  presents for depo shot and repeat STI testing. Patient was not able to get birth control patches after her last visit and picked up the depo but did not give it to herself, she wants to do it in the office now. She also would like to complete repeat chlamydia testing to make sure she has cleared it. Started having a little spotting today, maybe next period after miscarriage. Also reports some pain with urination lately too, no odor or fevers.     ROS:  GENERAL: No fever, chills, fatigability or weight loss.  VULVAR: No pain, no lesions and no itching.  VAGINAL: No relaxation, no itching, no discharge, no abnormal bleeding and no lesions.  ABDOMEN: No abdominal pain. Denies nausea. Denies vomiting. No diarrhea. No constipation  BREAST: Denies pain. No lumps. No discharge.  URINARY: No incontinence, no nocturia, no frequency and no dysuria.  CARDIOVASCULAR: No chest pain. No shortness of breath. No leg cramps.  NEUROLOGICAL: No headaches. No vision changes.      Patient History:  History reviewed. No pertinent past medical history.  History reviewed. No pertinent surgical history.  Social History     Tobacco Use    Smoking status: Never    Smokeless tobacco: Never   Substance Use Topics    Alcohol use: Never    Drug use: Not Currently     Types: Marijuana     Comment: last used 2023 per pt     No family history on file.  OB History    Para Term  AB Living   1             SAB IAB Ectopic Multiple Live Births                  # Outcome Date GA Lbr Jean-Claude/2nd Weight Sex Type Anes PTL Lv   1 Current                Objective:   LMP 2024 (Exact Date)   Patient's last menstrual period was 2024 (exact date).      PHYSICAL EXAM:  APPEARANCE: Well nourished, well developed, in no acute distress.  AFFECT: WNL, alert and oriented x 3  SKIN: No acne or hirsutism  NECK: Neck symmetric without masses or thyromegaly  CHEST:  Good respiratory effect  PELVIC: Normal external genitalia without lesions.  Normal hair distribution.  Adequate perineal body, normal urethral meatus.  Vagina moist and well rugated without lesions or discharge.    EXTREMITIES: No edema.      ASSESSMENT and PLAN:    ICD-10-CM ICD-9-CM    1. Depo-Provera contraceptive status  Z30.42 V25.49 medroxyPROGESTERone (DEPO-PROVERA) syringe 150 mg      2. Dysuria  R30.0 788.1 Urine culture      3. Chlamydia  A74.9 079.98 C. trachomatis/N. gonorrhoeae by AMP DNA Jluissstefani; Vagina          Depo, hx chlamydia   - IM injection given today, discussed repeat in 3 months in office  - repeat STI testing today, if positive will retreat. Discussed partner treatment as well     2. Dysuria   - urine culture today   - discussed abx treatment options if positive  - encouraged increasing PO fluids, may use Pyridium OTC prn.       Follow up: as needed if symptoms worsen or 3 months for next depo shot       Gwendolyn Alfred MD  OBGYN [facility]

## 2024-07-08 NOTE — TELEPHONE ENCOUNTER
After obtaining consent, and per orders of Dr. Alfred, injection of Depo given by Gladis Castro. Patient instructed to remain in clinic for 20 minutes afterwards, and to report any adverse reaction to me immediately.

## 2024-07-10 LAB
BACTERIA UR CULT: NORMAL
BACTERIA UR CULT: NORMAL
C TRACH DNA SPEC QL NAA+PROBE: NOT DETECTED
N GONORRHOEA DNA SPEC QL NAA+PROBE: NOT DETECTED

## 2024-09-23 ENCOUNTER — CLINICAL SUPPORT (OUTPATIENT)
Dept: OBSTETRICS AND GYNECOLOGY | Facility: CLINIC | Age: 19
End: 2024-09-23
Payer: MEDICAID

## 2024-09-23 DIAGNOSIS — Z30.42 DEPO-PROVERA CONTRACEPTIVE STATUS: Primary | ICD-10-CM

## 2024-09-23 PROCEDURE — 99999PBSHW PR PBB SHADOW TECHNICAL ONLY FILED TO HB: Mod: PBBFAC,,,

## 2024-09-23 PROCEDURE — 96372 THER/PROPH/DIAG INJ SC/IM: CPT | Mod: PBBFAC,PO

## 2024-09-23 RX ORDER — MEDROXYPROGESTERONE ACETATE 150 MG/ML
150 INJECTION, SUSPENSION INTRAMUSCULAR
Status: SHIPPED | OUTPATIENT
Start: 2024-09-23 | End: 2025-06-20

## 2024-09-23 RX ADMIN — MEDROXYPROGESTERONE ACETATE 150 MG: 150 INJECTION, SUSPENSION INTRAMUSCULAR at 09:09

## 2024-09-23 NOTE — PROGRESS NOTES
Administered 150 mg Depo-Provera given IM RUOQG. Depo sheet given. Next Depo due 12/9-12/23. Appointment made for 12/9 @ 8:40. Patient advised to wait 15 min to observe for any adverse reactions. If any observed report immediately. Provided depo sheet.  Patient verbalized understanding. Patient tolerated well, no redness or swelling noted.

## 2024-09-27 ENCOUNTER — HOSPITAL ENCOUNTER (INPATIENT)
Facility: HOSPITAL | Age: 19
LOS: 3 days | Discharge: HOME | DRG: 690 | End: 2024-09-30
Attending: EMERGENCY MEDICINE | Admitting: STUDENT IN AN ORGANIZED HEALTH CARE EDUCATION/TRAINING PROGRAM
Payer: MEDICAID

## 2024-09-27 DIAGNOSIS — R11.0 NAUSEA: ICD-10-CM

## 2024-09-27 DIAGNOSIS — N12 PYELONEPHRITIS: Primary | ICD-10-CM

## 2024-09-27 DIAGNOSIS — L02.91 PHLEGMON: ICD-10-CM

## 2024-09-27 DIAGNOSIS — R07.9 CHEST PAIN: ICD-10-CM

## 2024-09-27 PROBLEM — E87.6 HYPOKALEMIA: Status: ACTIVE | Noted: 2024-09-27

## 2024-09-27 LAB
ALBUMIN SERPL BCP-MCNC: 4.4 G/DL (ref 3.5–5.2)
ALP SERPL-CCNC: 50 U/L (ref 55–135)
ALT SERPL W/O P-5'-P-CCNC: 8 U/L (ref 10–44)
ANION GAP SERPL CALC-SCNC: 13 MMOL/L (ref 8–16)
AST SERPL-CCNC: 12 U/L (ref 10–40)
B-HCG UR QL: NEGATIVE
BACTERIA #/AREA URNS AUTO: ABNORMAL /HPF
BASOPHILS # BLD AUTO: 0.04 K/UL (ref 0–0.2)
BASOPHILS NFR BLD: 0.3 % (ref 0–1.9)
BILIRUB SERPL-MCNC: 0.6 MG/DL (ref 0.1–1)
BILIRUB UR QL STRIP: NEGATIVE
BUN SERPL-MCNC: 8 MG/DL (ref 6–20)
CALCIUM SERPL-MCNC: 9.7 MG/DL (ref 8.7–10.5)
CHLORIDE SERPL-SCNC: 106 MMOL/L (ref 95–110)
CLARITY UR REFRACT.AUTO: ABNORMAL
CO2 SERPL-SCNC: 20 MMOL/L (ref 23–29)
COLOR UR AUTO: YELLOW
CREAT SERPL-MCNC: 0.8 MG/DL (ref 0.5–1.4)
CTP QC/QA: YES
DIFFERENTIAL METHOD BLD: ABNORMAL
EOSINOPHIL # BLD AUTO: 0 K/UL (ref 0–0.5)
EOSINOPHIL NFR BLD: 0.3 % (ref 0–8)
ERYTHROCYTE [DISTWIDTH] IN BLOOD BY AUTOMATED COUNT: 12.2 % (ref 11.5–14.5)
EST. GFR  (NO RACE VARIABLE): >60 ML/MIN/1.73 M^2
GLUCOSE SERPL-MCNC: 104 MG/DL (ref 70–110)
GLUCOSE UR QL STRIP: NEGATIVE
HCT VFR BLD AUTO: 41.7 % (ref 37–48.5)
HGB BLD-MCNC: 13.3 G/DL (ref 12–16)
HGB UR QL STRIP: NEGATIVE
IMM GRANULOCYTES # BLD AUTO: 0.04 K/UL (ref 0–0.04)
IMM GRANULOCYTES NFR BLD AUTO: 0.3 % (ref 0–0.5)
KETONES UR QL STRIP: NEGATIVE
LEUKOCYTE ESTERASE UR QL STRIP: ABNORMAL
LIPASE SERPL-CCNC: 21 U/L (ref 4–60)
LYMPHOCYTES # BLD AUTO: 2.9 K/UL (ref 1–4.8)
LYMPHOCYTES NFR BLD: 24.1 % (ref 18–48)
MCH RBC QN AUTO: 26.6 PG (ref 27–31)
MCHC RBC AUTO-ENTMCNC: 31.9 G/DL (ref 32–36)
MCV RBC AUTO: 83 FL (ref 82–98)
MICROSCOPIC COMMENT: ABNORMAL
MONOCYTES # BLD AUTO: 1.1 K/UL (ref 0.3–1)
MONOCYTES NFR BLD: 9.1 % (ref 4–15)
NEUTROPHILS # BLD AUTO: 7.9 K/UL (ref 1.8–7.7)
NEUTROPHILS NFR BLD: 65.9 % (ref 38–73)
NITRITE UR QL STRIP: POSITIVE
NRBC BLD-RTO: 0 /100 WBC
PH UR STRIP: 6 [PH] (ref 5–8)
PLATELET # BLD AUTO: 200 K/UL (ref 150–450)
PMV BLD AUTO: 10.8 FL (ref 9.2–12.9)
POTASSIUM SERPL-SCNC: 3.4 MMOL/L (ref 3.5–5.1)
PROT SERPL-MCNC: 8.1 G/DL (ref 6–8.4)
PROT UR QL STRIP: NEGATIVE
RBC # BLD AUTO: 5 M/UL (ref 4–5.4)
RBC #/AREA URNS AUTO: 2 /HPF (ref 0–4)
SODIUM SERPL-SCNC: 139 MMOL/L (ref 136–145)
SP GR UR STRIP: 1.01 (ref 1–1.03)
SQUAMOUS #/AREA URNS AUTO: 1 /HPF
TSH SERPL DL<=0.005 MIU/L-ACNC: 1.29 UIU/ML (ref 0.4–4)
URN SPEC COLLECT METH UR: ABNORMAL
WBC # BLD AUTO: 12.05 K/UL (ref 3.9–12.7)
WBC #/AREA URNS AUTO: 16 /HPF (ref 0–5)

## 2024-09-27 PROCEDURE — G0378 HOSPITAL OBSERVATION PER HR: HCPCS

## 2024-09-27 PROCEDURE — 25000003 PHARM REV CODE 250: Performed by: PHYSICIAN ASSISTANT

## 2024-09-27 PROCEDURE — 25500020 PHARM REV CODE 255: Performed by: EMERGENCY MEDICINE

## 2024-09-27 PROCEDURE — 83690 ASSAY OF LIPASE: CPT

## 2024-09-27 PROCEDURE — 81001 URINALYSIS AUTO W/SCOPE: CPT

## 2024-09-27 PROCEDURE — 63600175 PHARM REV CODE 636 W HCPCS: Performed by: PHYSICIAN ASSISTANT

## 2024-09-27 PROCEDURE — A4216 STERILE WATER/SALINE, 10 ML: HCPCS | Performed by: PHYSICIAN ASSISTANT

## 2024-09-27 PROCEDURE — 81025 URINE PREGNANCY TEST: CPT

## 2024-09-27 PROCEDURE — 85025 COMPLETE CBC W/AUTO DIFF WBC: CPT

## 2024-09-27 PROCEDURE — 25000003 PHARM REV CODE 250: Performed by: STUDENT IN AN ORGANIZED HEALTH CARE EDUCATION/TRAINING PROGRAM

## 2024-09-27 PROCEDURE — 87086 URINE CULTURE/COLONY COUNT: CPT

## 2024-09-27 PROCEDURE — 25000003 PHARM REV CODE 250

## 2024-09-27 PROCEDURE — 80053 COMPREHEN METABOLIC PANEL: CPT

## 2024-09-27 PROCEDURE — 96365 THER/PROPH/DIAG IV INF INIT: CPT

## 2024-09-27 PROCEDURE — 87186 SC STD MICRODIL/AGAR DIL: CPT

## 2024-09-27 PROCEDURE — 96375 TX/PRO/DX INJ NEW DRUG ADDON: CPT

## 2024-09-27 PROCEDURE — 11000001 HC ACUTE MED/SURG PRIVATE ROOM

## 2024-09-27 PROCEDURE — 87040 BLOOD CULTURE FOR BACTERIA: CPT | Mod: 59 | Performed by: PHYSICIAN ASSISTANT

## 2024-09-27 PROCEDURE — 63600175 PHARM REV CODE 636 W HCPCS

## 2024-09-27 PROCEDURE — 84443 ASSAY THYROID STIM HORMONE: CPT

## 2024-09-27 PROCEDURE — 96361 HYDRATE IV INFUSION ADD-ON: CPT

## 2024-09-27 PROCEDURE — 99285 EMERGENCY DEPT VISIT HI MDM: CPT | Mod: 25

## 2024-09-27 PROCEDURE — 87088 URINE BACTERIA CULTURE: CPT

## 2024-09-27 RX ORDER — ONDANSETRON HYDROCHLORIDE 2 MG/ML
4 INJECTION, SOLUTION INTRAVENOUS ONCE
Status: COMPLETED | OUTPATIENT
Start: 2024-09-27 | End: 2024-09-27

## 2024-09-27 RX ORDER — ALUMINUM HYDROXIDE, MAGNESIUM HYDROXIDE, AND SIMETHICONE 1200; 120; 1200 MG/30ML; MG/30ML; MG/30ML
30 SUSPENSION ORAL 4 TIMES DAILY PRN
Status: DISCONTINUED | OUTPATIENT
Start: 2024-09-27 | End: 2024-09-30 | Stop reason: HOSPADM

## 2024-09-27 RX ORDER — ACETAMINOPHEN 500 MG
1000 TABLET ORAL
Status: COMPLETED | OUTPATIENT
Start: 2024-09-27 | End: 2024-09-27

## 2024-09-27 RX ORDER — SODIUM CHLORIDE 0.9 % (FLUSH) 0.9 %
10 SYRINGE (ML) INJECTION EVERY 8 HOURS
Status: DISCONTINUED | OUTPATIENT
Start: 2024-09-27 | End: 2024-09-30 | Stop reason: HOSPADM

## 2024-09-27 RX ORDER — IBUPROFEN 200 MG
24 TABLET ORAL
Status: DISCONTINUED | OUTPATIENT
Start: 2024-09-27 | End: 2024-09-30 | Stop reason: HOSPADM

## 2024-09-27 RX ORDER — POTASSIUM CHLORIDE 750 MG/1
30 CAPSULE, EXTENDED RELEASE ORAL ONCE
Status: COMPLETED | OUTPATIENT
Start: 2024-09-27 | End: 2024-09-27

## 2024-09-27 RX ORDER — CRANBERRY FRUIT 450 MG
1 TABLET ORAL DAILY
COMMUNITY

## 2024-09-27 RX ORDER — KETOROLAC TROMETHAMINE 30 MG/ML
15 INJECTION, SOLUTION INTRAMUSCULAR; INTRAVENOUS EVERY 6 HOURS PRN
Status: DISPENSED | OUTPATIENT
Start: 2024-09-27 | End: 2024-09-30

## 2024-09-27 RX ORDER — POLYETHYLENE GLYCOL 3350 17 G/17G
17 POWDER, FOR SOLUTION ORAL DAILY PRN
Status: DISCONTINUED | OUTPATIENT
Start: 2024-09-27 | End: 2024-09-30 | Stop reason: HOSPADM

## 2024-09-27 RX ORDER — PROCHLORPERAZINE EDISYLATE 5 MG/ML
5 INJECTION INTRAMUSCULAR; INTRAVENOUS EVERY 6 HOURS PRN
Status: DISCONTINUED | OUTPATIENT
Start: 2024-09-27 | End: 2024-09-30 | Stop reason: HOSPADM

## 2024-09-27 RX ORDER — SIMETHICONE 80 MG
1 TABLET,CHEWABLE ORAL 4 TIMES DAILY PRN
Status: DISCONTINUED | OUTPATIENT
Start: 2024-09-27 | End: 2024-09-30 | Stop reason: HOSPADM

## 2024-09-27 RX ORDER — PUMPKIN SEED EXTRACT/SOY GERM 300 MG
1 CAPSULE ORAL
COMMUNITY

## 2024-09-27 RX ORDER — GLUCAGON 1 MG
1 KIT INJECTION
Status: DISCONTINUED | OUTPATIENT
Start: 2024-09-27 | End: 2024-09-30 | Stop reason: HOSPADM

## 2024-09-27 RX ORDER — IBUPROFEN 200 MG
16 TABLET ORAL
Status: DISCONTINUED | OUTPATIENT
Start: 2024-09-27 | End: 2024-09-30 | Stop reason: HOSPADM

## 2024-09-27 RX ORDER — ACETAMINOPHEN 325 MG/1
650 TABLET ORAL EVERY 4 HOURS PRN
Status: DISCONTINUED | OUTPATIENT
Start: 2024-09-27 | End: 2024-09-27

## 2024-09-27 RX ORDER — NALOXONE HCL 0.4 MG/ML
0.02 VIAL (ML) INJECTION
Status: DISCONTINUED | OUTPATIENT
Start: 2024-09-27 | End: 2024-09-30 | Stop reason: HOSPADM

## 2024-09-27 RX ORDER — TALC
6 POWDER (GRAM) TOPICAL NIGHTLY PRN
Status: DISCONTINUED | OUTPATIENT
Start: 2024-09-27 | End: 2024-09-30 | Stop reason: HOSPADM

## 2024-09-27 RX ORDER — IPRATROPIUM BROMIDE AND ALBUTEROL SULFATE 2.5; .5 MG/3ML; MG/3ML
3 SOLUTION RESPIRATORY (INHALATION) EVERY 6 HOURS PRN
Status: DISCONTINUED | OUTPATIENT
Start: 2024-09-27 | End: 2024-09-30 | Stop reason: HOSPADM

## 2024-09-27 RX ORDER — ONDANSETRON 8 MG/1
8 TABLET, ORALLY DISINTEGRATING ORAL EVERY 8 HOURS PRN
Status: DISCONTINUED | OUTPATIENT
Start: 2024-09-27 | End: 2024-09-30 | Stop reason: HOSPADM

## 2024-09-27 RX ORDER — ENOXAPARIN SODIUM 100 MG/ML
40 INJECTION SUBCUTANEOUS EVERY 24 HOURS
Status: DISCONTINUED | OUTPATIENT
Start: 2024-09-27 | End: 2024-09-30 | Stop reason: HOSPADM

## 2024-09-27 RX ORDER — SODIUM CHLORIDE, SODIUM LACTATE, POTASSIUM CHLORIDE, CALCIUM CHLORIDE 600; 310; 30; 20 MG/100ML; MG/100ML; MG/100ML; MG/100ML
INJECTION, SOLUTION INTRAVENOUS CONTINUOUS
Status: DISCONTINUED | OUTPATIENT
Start: 2024-09-27 | End: 2024-09-28

## 2024-09-27 RX ORDER — ACETAMINOPHEN 500 MG
1000 TABLET ORAL EVERY 8 HOURS
Status: DISCONTINUED | OUTPATIENT
Start: 2024-09-27 | End: 2024-09-29

## 2024-09-27 RX ADMIN — Medication 10 ML: at 10:09

## 2024-09-27 RX ADMIN — Medication 10 ML: at 02:09

## 2024-09-27 RX ADMIN — KETOROLAC TROMETHAMINE 15 MG: 30 INJECTION, SOLUTION INTRAMUSCULAR; INTRAVENOUS at 02:09

## 2024-09-27 RX ADMIN — ACETAMINOPHEN 1000 MG: 500 TABLET ORAL at 10:09

## 2024-09-27 RX ADMIN — ONDANSETRON 4 MG: 2 INJECTION INTRAMUSCULAR; INTRAVENOUS at 08:09

## 2024-09-27 RX ADMIN — ACETAMINOPHEN 1000 MG: 500 TABLET ORAL at 08:09

## 2024-09-27 RX ADMIN — POTASSIUM CHLORIDE 30 MEQ: 750 CAPSULE, EXTENDED RELEASE ORAL at 10:09

## 2024-09-27 RX ADMIN — CEFTRIAXONE 1 G: 1 INJECTION, POWDER, FOR SOLUTION INTRAMUSCULAR; INTRAVENOUS at 11:09

## 2024-09-27 RX ADMIN — IOHEXOL 75 ML: 350 INJECTION, SOLUTION INTRAVENOUS at 10:09

## 2024-09-27 RX ADMIN — SODIUM CHLORIDE, SODIUM LACTATE, POTASSIUM CHLORIDE, AND CALCIUM CHLORIDE: 600; 310; 30; 20 INJECTION, SOLUTION INTRAVENOUS at 03:09

## 2024-09-27 RX ADMIN — KETOROLAC TROMETHAMINE 15 MG: 30 INJECTION, SOLUTION INTRAMUSCULAR; INTRAVENOUS at 10:09

## 2024-09-27 RX ADMIN — SODIUM CHLORIDE, POTASSIUM CHLORIDE, SODIUM LACTATE AND CALCIUM CHLORIDE 1000 ML: 600; 310; 30; 20 INJECTION, SOLUTION INTRAVENOUS at 08:09

## 2024-09-27 NOTE — SUBJECTIVE & OBJECTIVE
History reviewed. No pertinent past medical history.    History reviewed. No pertinent surgical history.    Review of patient's allergies indicates:   Allergen Reactions    Cat/feline products Hives       Current Facility-Administered Medications on File Prior to Encounter   Medication    medroxyPROGESTERone (DEPO-PROVERA) syringe 150 mg     Current Outpatient Medications on File Prior to Encounter   Medication Sig    medroxyPROGESTERone (DEPO-PROVERA) 150 mg/mL Syrg Inject 1 mL (150 mg total) into the muscle every 3 (three) months.    miSOPROStoL (CYTOTEC) 200 MCG Tab Take 2 tablets (400 mcg total) by mouth every 6 (six) hours. for 4 doses    norelgestromin-ethinyl estradiol 150-35 mcg/24 hr Place 1 patch onto the skin every 7 days.    ondansetron (ZOFRAN) 4 MG tablet Take 1 tablet (4 mg total) by mouth daily as needed for Nausea.    prenatal vit no.124/iron/folic (PRENATAL VITAMIN ORAL) Take 1 Dose by mouth Daily.     Family History    None       Tobacco Use    Smoking status: Never    Smokeless tobacco: Never   Substance and Sexual Activity    Alcohol use: Never    Drug use: Not Currently     Types: Marijuana     Comment: last used 06/2023 per pt    Sexual activity: Yes     Partners: Male     Review of Systems   Constitutional:  Positive for chills and diaphoresis. Negative for activity change, fatigue and fever.   HENT:  Negative for congestion, rhinorrhea and sore throat.    Respiratory:  Negative for cough, chest tightness, shortness of breath and wheezing.    Cardiovascular:  Negative for chest pain, palpitations and leg swelling.   Gastrointestinal:  Positive for nausea. Negative for abdominal distention, abdominal pain, blood in stool, constipation, diarrhea and vomiting.   Genitourinary:  Negative for difficulty urinating, dysuria, frequency, hematuria and urgency.   Musculoskeletal:  Positive for back pain. Negative for arthralgias and neck stiffness.   Neurological:  Negative for dizziness, tremors,  seizures, syncope, weakness, light-headedness, numbness and headaches.   Psychiatric/Behavioral:  Negative for agitation, confusion and hallucinations.      Objective:     Vital Signs (Most Recent):  Temp: 98.6 °F (37 °C) (09/27/24 0816)  Pulse: 88 (09/27/24 1348)  Resp: 18 (09/27/24 1348)  BP: 108/66 (09/27/24 1348)  SpO2: 99 % (09/27/24 1348) Vital Signs (24h Range):  Temp:  [98.6 °F (37 °C)] 98.6 °F (37 °C)  Pulse:  [] 88  Resp:  [18] 18  SpO2:  [99 %-100 %] 99 %  BP: (102-108)/(58-68) 108/66     Weight: 52.2 kg (115 lb)  Body mass index is 21.73 kg/m².     Physical Exam  Vitals and nursing note reviewed.   Constitutional:       General: She is not in acute distress.     Appearance: She is well-developed. She is not diaphoretic.   HENT:      Head: Normocephalic and atraumatic.      Right Ear: External ear normal.      Left Ear: External ear normal.      Nose: Nose normal. No congestion.      Mouth/Throat:      Pharynx: Oropharynx is clear.   Eyes:      General: No scleral icterus.     Extraocular Movements: Extraocular movements intact.   Cardiovascular:      Rate and Rhythm: Normal rate and regular rhythm.      Pulses: Normal pulses.      Heart sounds: Normal heart sounds. No murmur heard.  Pulmonary:      Effort: Pulmonary effort is normal. No respiratory distress.      Breath sounds: Normal breath sounds. No wheezing or rales.   Abdominal:      General: Bowel sounds are normal. There is no distension.      Palpations: Abdomen is soft.      Tenderness: There is no abdominal tenderness. There is right CVA tenderness. There is no guarding or rebound.   Musculoskeletal:      Cervical back: Normal range of motion.      Right lower leg: No edema.      Left lower leg: No edema.   Skin:     General: Skin is warm and dry.      Capillary Refill: Capillary refill takes less than 2 seconds.   Neurological:      General: No focal deficit present.      Mental Status: She is alert and oriented to person, place, and  time. Mental status is at baseline.   Psychiatric:         Mood and Affect: Mood normal.         Behavior: Behavior normal.         Thought Content: Thought content normal.                Significant Labs: All pertinent labs within the past 24 hours have been reviewed.  CBC:   Recent Labs   Lab 09/27/24  0838   WBC 12.05   HGB 13.3   HCT 41.7        CMP:   Recent Labs   Lab 09/27/24  0838      K 3.4*      CO2 20*      BUN 8   CREATININE 0.8   CALCIUM 9.7   PROT 8.1   ALBUMIN 4.4   BILITOT 0.6   ALKPHOS 50*   AST 12   ALT 8*   ANIONGAP 13     Urine Studies:   Recent Labs   Lab 09/27/24  1000   COLORU Yellow   APPEARANCEUA Hazy*   PHUR 6.0   SPECGRAV 1.010   PROTEINUA Negative   GLUCUA Negative   KETONESU Negative   BILIRUBINUA Negative   OCCULTUA Negative   NITRITE Positive*   LEUKOCYTESUR 2+*   RBCUA 2   WBCUA 16*   BACTERIA Few*   SQUAMEPITHEL 1       Significant Imaging: I have reviewed all pertinent imaging results/findings within the past 24 hours.  Imaging Results               CT Abdomen Pelvis With IV Contrast NO Oral Contrast (Final result)  Result time 09/27/24 11:59:43      Final result by Nii Pizarro MD (09/27/24 11:59:43)                   Impression:      Findings suggestive of pyelonephritis with phlegmonous change/possible early abscess formation, as above.  Recommend further evaluation with ultrasound to assess abnormal area for fluid collection.    Trace free fluid in the pelvis, possibly physiologic.    This report was flagged in Epic as abnormal.    Electronically signed by resident: Nova Vides  Date:    09/27/2024  Time:    11:05    Electronically signed by: Nii Pizarro MD  Date:    09/27/2024  Time:    11:59               Narrative:    EXAMINATION:  CT ABDOMEN PELVIS WITH IV CONTRAST    CLINICAL HISTORY:  RLQ abdominal pain (Age >= 14y);    TECHNIQUE:  Axial images of the abdomen and pelvis were acquired after the use of 75 cc [id number] IV contrast.  Coronal  and sagittal reconstructions were also obtained    COMPARISON:  Ultrasound 09/27/2024    FINDINGS:  Thoracic soft tissues: Partially visualized thoracic soft tissues appear unremarkable.    Heart: No pericardial effusion.    Lungs: Lung bases are clear.  No pleural effusion.    Esophagus: Unremarkable.    Stomach and duodenum: Unremarkable.    Liver: Normal in size and contour.  No focal hepatic lesion.    Gallbladder: No calcified gallstones.    Bile Ducts: No evidence of dilated ducts.    Spleen: Unremarkable.    Pancreas: No mass or peripancreatic fat stranding.    Adrenals: Unremarkable.    Kidneys/Ureters: Ill-defined area of hypodensity extending from the central kidney to the periphery with some extension beyond the renal parenchyma (601-53).  Finding is nonspecific but is concerning for pyelonephritis with phlegmonous change/possible early abscess formation.  Additional small hypodensity in the right kidney, too small to characterize.  No hydronephrosis or nephrolithiasis. No ureteral dilatation.    Bladder: No evidence of wall thickening.    Reproductive organs: Unremarkable.    Bowel/Mesentery: Small bowel is normal in caliber, no evidence of obstruction.  No evidence of inflammation.  Colon appears normal without apparent wall thickening or inflammatory change.  Appendix is normal.    Peritoneum: Trace free fluid in the pelvis.  No free air.    Lymph nodes: No lymphadenopathy.    Abdominal wall:  Unremarkable.    Vasculature: No aneurysm. No significant calcific atherosclerosis.    Bones: No acute fracture. No suspicious osseous lesions.                                       US Abdomen Limited (Final result)  Result time 09/27/24 10:17:53      Final result by Dimas Reilly MD (09/27/24 10:17:53)                   Impression:      No significant sonographic abnormality.      Electronically signed by: Dimas Reilly  Date:    09/27/2024  Time:    10:17               Narrative:    EXAMINATION:  US ABDOMEN  LIMITED    CLINICAL HISTORY:  Concern for cholelithiasis/cholecystitis.;    TECHNIQUE:  Limited ultrasound of the right upper quadrant of the abdomen focused on the biliary system was performed.    COMPARISON:  None    FINDINGS:  Pancreas: Visualized portions appear normal.    Gallbladder: No calculi, wall thickening, or pericholecystic fluid.  No sonographic Arreola's sign.    Biliary system: The common duct measures 2 mm. No intrahepatic ductal dilatation.    Miscellaneous: No abnormalities in the visualized liver or right kidney. No ascites.

## 2024-09-27 NOTE — PHARMACY MED REC
"        Admission Medication History     The home medication history was taken by Yennifer Rey.    You may go to "Admission" then "Reconcile Home Medications" tabs to review and/or act upon these items.     The home medication list has been updated by the Pharmacy department.   Please read ALL comments highlighted in yellow.   Please address this information as you see fit.    Feel free to contact us if you have any questions or require assistance.      The medications listed below were removed from the home medication list. Please reorder if appropriate:  Patient reports no longer taking the following medication(s):  MISOPROSTOL 200 MCG TABLET  NORELGESTROMIN-ETHINYL ESTRADIOL 150-35 MCG PATCH  ONDANSETRON 4 MG TABLET  PRENATAL VITAMIN TABLET    Medications listed below were obtained from: Patient/family and Analytic software- [employer]  Current Outpatient Medications on File Prior to Encounter   Medication Sig    boric acid 600 mg Supp   Place 1 suppository vaginally every 7 days.    cranberry fruit (CRANBERRY) 450 mg Tab   Take 1 tablet by mouth once daily.    medroxyPROGESTERone (DEPO-PROVERA) 150 mg/mL Syrg   Inject 1 mL (150 mg total) into the muscle every 3 (three) months.    TURMERIC ORAL   Take 1 capsule by mouth once daily.           Potential issues to be addressed PRIOR TO DISCHARGE  Patient requires education regarding drug therapies     Yennifer Rey  EXT [id number]          .          "

## 2024-09-27 NOTE — ED PROVIDER NOTES
Encounter Date: 9/27/2024       History     Chief Complaint   Patient presents with    Abdominal Pain     RLQ pain started yesterday morning. +nausea. Denies V/D.      The history is provided by the patient and a friend.     Patient is a 19-year-old female with no significant past medical history who presents due to right upper quadrant abdominal pain, nausea and general unwell feeling.  She notes that the symptoms started yesterday and since that time she has not been able to tolerate oral intake.  She has not had any episodes of emesis but still feels nauseous.  Notes that the pain in the right upper quadrant of her abdomen developed starting earlier this morning which is what prompted her to come into the emergency department.  Has not had any problems with bowel movements or urination.  Unsure if the pain in her abdomen correlates with eating since the pain started after the last time she had and she has not been able to tolerate anything orally since.  Notes that she has not had any fevers at home but that she has felt diaphoretic/hot and cold flashes while at home.  Prior to the onset of the symptoms she was in her baseline state of health.  Denying all other symptoms at this time.    Review of patient's allergies indicates:   Allergen Reactions    Cat/feline products Hives     History reviewed. No pertinent past medical history.  History reviewed. No pertinent surgical history.  No family history on file.  Social History     Tobacco Use    Smoking status: Never    Smokeless tobacco: Never   Substance Use Topics    Alcohol use: Never    Drug use: Not Currently     Types: Marijuana     Comment: last used 06/2023 per pt       Physical Exam     Initial Vitals [09/27/24 0816]   BP Pulse Resp Temp SpO2   102/67 (!) 118 18 98.6 °F (37 °C) 100 %      MAP       --         Physical Exam    Nursing note and vitals reviewed.  Constitutional: She appears well-developed. No distress.   HENT:   Head: Normocephalic and  atraumatic.   Mouth/Throat: Oropharynx is clear and moist and mucous membranes are normal.   Eyes: EOM are normal. Pupils are equal, round, and reactive to light.   Neck:   Normal range of motion.  Cardiovascular:  Regular rhythm and normal heart sounds.           No murmur heard.  Mildly tachycardic   Pulmonary/Chest: Breath sounds normal. No respiratory distress. She has no wheezes. She has no rhonchi. She has no rales.   Abdominal:   Arreola's sign positive.  Mild degree of guarding present when right upper quadrant of the abdomen is being palpated.  McBurney's point is negative, rosivg sign negative.  The abdomen is not distended and is soft to palpation.  No peritoneal signs.   Musculoskeletal:         General: No edema.      Cervical back: Normal range of motion.      Comments: Moderate to significant degree of pain in the right flank region     Neurological: She is alert and oriented to person, place, and time.   Skin: Skin is warm.   Psychiatric: She has a normal mood and affect. Her behavior is normal. Thought content normal.         ED Course   Procedures  Labs Reviewed   CBC W/ AUTO DIFFERENTIAL - Abnormal       Result Value    WBC 12.05      RBC 5.00      Hemoglobin 13.3      Hematocrit 41.7      MCV 83      MCH 26.6 (*)     MCHC 31.9 (*)     RDW 12.2      Platelets 200      MPV 10.8      Immature Granulocytes 0.3      Gran # (ANC) 7.9 (*)     Immature Grans (Abs) 0.04      Lymph # 2.9      Mono # 1.1 (*)     Eos # 0.0      Baso # 0.04      nRBC 0      Gran % 65.9      Lymph % 24.1      Mono % 9.1      Eosinophil % 0.3      Basophil % 0.3      Differential Method Automated      Narrative:     ADD ON TSH PER RASHAD PRITCHARD MD ORDER# [id number] @  09/27/2024    08:55    COMPREHENSIVE METABOLIC PANEL - Abnormal    Sodium 139      Potassium 3.4 (*)     Chloride 106      CO2 20 (*)     Glucose 104      BUN 8      Creatinine 0.8      Calcium 9.7      Total Protein 8.1      Albumin 4.4      Total Bilirubin 0.6       Alkaline Phosphatase 50 (*)     AST 12      ALT 8 (*)     eGFR >60.0      Anion Gap 13      Narrative:     ADD ON TSH PER RASHAD PRITCHARD MD ORDER# [id number] @  09/27/2024    08:55    URINALYSIS, REFLEX TO URINE CULTURE - Abnormal    Specimen UA Urine, Clean Catch      Color, UA Yellow      Appearance, UA Hazy (*)     pH, UA 6.0      Specific Gravity, UA 1.010      Protein, UA Negative      Glucose, UA Negative      Ketones, UA Negative      Bilirubin (UA) Negative      Occult Blood UA Negative      Nitrite, UA Positive (*)     Leukocytes, UA 2+ (*)     Narrative:     Specimen Source->Urine   URINALYSIS MICROSCOPIC - Abnormal    RBC, UA 2      WBC, UA 16 (*)     Bacteria Few (*)     Squam Epithel, UA 1      Microscopic Comment SEE COMMENT      Narrative:     Specimen Source->Urine   CULTURE, URINE   LIPASE    Lipase 21      Narrative:     ADD ON TSH PER RASHAD PRITCHARD MD ORDER# [id number] @  09/27/2024    08:55    TSH   TSH    TSH 1.290      Narrative:     ADD ON TSH PER RASHAD PRITCHARD MD ORDER# [id number] @  09/27/2024    08:55    POCT URINE PREGNANCY    POC Preg Test, Ur Negative       Acceptable Yes            Imaging Results               CT Abdomen Pelvis With IV Contrast NO Oral Contrast (Final result)  Result time 09/27/24 11:59:43      Final result by Nii Pizarro MD (09/27/24 11:59:43)                   Impression:      Findings suggestive of pyelonephritis with phlegmonous change/possible early abscess formation, as above.  Recommend further evaluation with ultrasound to assess abnormal area for fluid collection.    Trace free fluid in the pelvis, possibly physiologic.    This report was flagged in Epic as abnormal.    Electronically signed by resident: Nova Vides  Date:    09/27/2024  Time:    11:05    Electronically signed by: Nii Pizarro MD  Date:    09/27/2024  Time:    11:59               Narrative:    EXAMINATION:  CT ABDOMEN PELVIS WITH IV CONTRAST    CLINICAL  HISTORY:  RLQ abdominal pain (Age >= 14y);    TECHNIQUE:  Axial images of the abdomen and pelvis were acquired after the use of 75 cc [id number] IV contrast.  Coronal and sagittal reconstructions were also obtained    COMPARISON:  Ultrasound 09/27/2024    FINDINGS:  Thoracic soft tissues: Partially visualized thoracic soft tissues appear unremarkable.    Heart: No pericardial effusion.    Lungs: Lung bases are clear.  No pleural effusion.    Esophagus: Unremarkable.    Stomach and duodenum: Unremarkable.    Liver: Normal in size and contour.  No focal hepatic lesion.    Gallbladder: No calcified gallstones.    Bile Ducts: No evidence of dilated ducts.    Spleen: Unremarkable.    Pancreas: No mass or peripancreatic fat stranding.    Adrenals: Unremarkable.    Kidneys/Ureters: Ill-defined area of hypodensity extending from the central kidney to the periphery with some extension beyond the renal parenchyma (601-53).  Finding is nonspecific but is concerning for pyelonephritis with phlegmonous change/possible early abscess formation.  Additional small hypodensity in the right kidney, too small to characterize.  No hydronephrosis or nephrolithiasis. No ureteral dilatation.    Bladder: No evidence of wall thickening.    Reproductive organs: Unremarkable.    Bowel/Mesentery: Small bowel is normal in caliber, no evidence of obstruction.  No evidence of inflammation.  Colon appears normal without apparent wall thickening or inflammatory change.  Appendix is normal.    Peritoneum: Trace free fluid in the pelvis.  No free air.    Lymph nodes: No lymphadenopathy.    Abdominal wall:  Unremarkable.    Vasculature: No aneurysm. No significant calcific atherosclerosis.    Bones: No acute fracture. No suspicious osseous lesions.                                       US Abdomen Limited (Final result)  Result time 09/27/24 10:17:53      Final result by Dimas Reilly MD (09/27/24 10:17:53)                   Impression:      No  significant sonographic abnormality.      Electronically signed by: Dimas Reilly  Date:    09/27/2024  Time:    10:17               Narrative:    EXAMINATION:  US ABDOMEN LIMITED    CLINICAL HISTORY:  Concern for cholelithiasis/cholecystitis.;    TECHNIQUE:  Limited ultrasound of the right upper quadrant of the abdomen focused on the biliary system was performed.    COMPARISON:  None    FINDINGS:  Pancreas: Visualized portions appear normal.    Gallbladder: No calculi, wall thickening, or pericholecystic fluid.  No sonographic Arreola's sign.    Biliary system: The common duct measures 2 mm. No intrahepatic ductal dilatation.    Miscellaneous: No abnormalities in the visualized liver or right kidney. No ascites.                                       Medications   lactated ringers bolus 1,000 mL (0 mLs Intravenous Stopped 9/27/24 1023)   ondansetron injection 4 mg (4 mg Intravenous Given 9/27/24 0850)   acetaminophen tablet 1,000 mg (1,000 mg Oral Given 9/27/24 0849)   iohexoL (OMNIPAQUE 350) injection 75 mL (75 mLs Intravenous Given 9/27/24 1056)   cefTRIAXone (Rocephin) 1 g in D5W 100 mL IVPB (MB+) (0 g Intravenous Stopped 9/27/24 1222)     Medical Decision Making  Patient was a 19-year-old female who presents due to acute onset right upper quadrant abdominal pain as well as nausea and decreased oral intake.  Differential diagnosis includes but is not limited to cholelithiasis, cholecystitis, choledocholithiasis, cholangitis, pancreatitis, gastroenteritis, leiomyomata, urinary tract infection, pyelonephritis, thyroiditis, lower clinical concern for ovarian torsion or appendicitis given reassuring physical exam.  She was noted to be tachycardic on my initial evaluation and although she was afebrile, she did feel warm to palpation.  For this reason, I will perform an abdominal workup with basic labs, TSH, lipase, UA and a right upper quadrant ultrasound.    Right upper quadrant ultrasound was found to be negative for  biliary pathology.  For this reason, we will obtain a CT abdomen.    UA found to be significant for nitrites/WBCs, in the setting right flank pain this is likely pyelonephritis.  We will give her a dose of ceftriaxone here.    CT abdomen significant not only for pyelonephritis but a developing phlegmon/abscess formation.  In the setting of concern/risk for systemic infection secondary to this along with decreased ability for oral antibiotics to penetrate, decision made that it would likely be better for her to be admitted for IV antibiotics rather than discharged on oral.  I had a discussion with the patient and she was okay with this.  Patient to be admitted to hospital medicine for IV antibiotics.    Amount and/or Complexity of Data Reviewed  Labs: ordered.  Radiology: ordered.    Risk  OTC drugs.  Prescription drug management.  Decision regarding hospitalization.                                Clinical Impression:  Final diagnoses:  [N12] Pyelonephritis (Primary)  [R11.0] Nausea  [L02.91] Phlegmon          ED Disposition Condition    Observation                 Rajan Whipple MD  Resident  09/27/24 2345

## 2024-09-27 NOTE — H&P
[facility] Medicine  History & Physical    Patient Name: Hema Riley  MRN: [id number]  Patient Class: OP- Observation  Admission Date: 9/27/2024  Attending Physician: Enedelia Forman MD   Primary Care Provider: Elizabeth Guadarrama MD         Patient information was obtained from patient, past medical records, and ER records.     Subjective:     Principal Problem:Pyelonephritis    Chief Complaint:   Chief Complaint   Patient presents with    Abdominal Pain     RLQ pain started yesterday morning. +nausea. Denies V/D.         HPI: Ms. Hema Riley is a 19-year-old female with no significant past medical history who presents due to back pain, nausea and general unwell feeling. She notes that the symptoms started yesterday and since that time she has not been able to tolerate oral intake. She has not had any episodes of emesis but still feels nauseous. Notes that the pain in the right upper back of her abdomen developed starting earlier this morning which is what prompted her to come into the emergency department.  Has not had any problems with bowel movements or urination. Notes that she has not had any fevers at home but that she has felt diaphoretic/hot and cold flashes while at home.  Prior to the onset of the symptoms she was in her baseline state of health.    In ED, AFVSS on RA. No leukocytosis. K 3.4. UA grossly infectious, 2+ leukocytes, 16 WBC. CT a/p: Findings suggestive of pyelonephritis with phlegmonous change/possible early abscess formation. Trace free fluid in the pelvis, possibly physiologic. Given tylenol, rocephin and LR bolus. Admitted to  for pyelonephritis.    History reviewed. No pertinent past medical history.    History reviewed. No pertinent surgical history.    Review of patient's allergies indicates:   Allergen Reactions    Cat/feline products Hives       Current Facility-Administered Medications on File Prior to Encounter   Medication    medroxyPROGESTERone  (DEPO-PROVERA) syringe 150 mg     Current Outpatient Medications on File Prior to Encounter   Medication Sig    medroxyPROGESTERone (DEPO-PROVERA) 150 mg/mL Syrg Inject 1 mL (150 mg total) into the muscle every 3 (three) months.    miSOPROStoL (CYTOTEC) 200 MCG Tab Take 2 tablets (400 mcg total) by mouth every 6 (six) hours. for 4 doses    norelgestromin-ethinyl estradiol 150-35 mcg/24 hr Place 1 patch onto the skin every 7 days.    ondansetron (ZOFRAN) 4 MG tablet Take 1 tablet (4 mg total) by mouth daily as needed for Nausea.    prenatal vit no.124/iron/folic (PRENATAL VITAMIN ORAL) Take 1 Dose by mouth Daily.     Family History    None       Tobacco Use    Smoking status: Never    Smokeless tobacco: Never   Substance and Sexual Activity    Alcohol use: Never    Drug use: Not Currently     Types: Marijuana     Comment: last used 06/2023 per pt    Sexual activity: Yes     Partners: Male     Review of Systems   Constitutional:  Positive for chills and diaphoresis. Negative for activity change, fatigue and fever.   HENT:  Negative for congestion, rhinorrhea and sore throat.    Respiratory:  Negative for cough, chest tightness, shortness of breath and wheezing.    Cardiovascular:  Negative for chest pain, palpitations and leg swelling.   Gastrointestinal:  Positive for nausea. Negative for abdominal distention, abdominal pain, blood in stool, constipation, diarrhea and vomiting.   Genitourinary:  Negative for difficulty urinating, dysuria, frequency, hematuria and urgency.   Musculoskeletal:  Positive for back pain. Negative for arthralgias and neck stiffness.   Neurological:  Negative for dizziness, tremors, seizures, syncope, weakness, light-headedness, numbness and headaches.   Psychiatric/Behavioral:  Negative for agitation, confusion and hallucinations.      Objective:     Vital Signs (Most Recent):  Temp: 98.6 °F (37 °C) (09/27/24 0816)  Pulse: 88 (09/27/24 1348)  Resp: 18 (09/27/24 1348)  BP: 108/66 (09/27/24  1348)  SpO2: 99 % (09/27/24 1348) Vital Signs (24h Range):  Temp:  [98.6 °F (37 °C)] 98.6 °F (37 °C)  Pulse:  [] 88  Resp:  [18] 18  SpO2:  [99 %-100 %] 99 %  BP: (102-108)/(58-68) 108/66     Weight: 52.2 kg (115 lb)  Body mass index is 21.73 kg/m².     Physical Exam  Vitals and nursing note reviewed.   Constitutional:       General: She is not in acute distress.     Appearance: She is well-developed. She is not diaphoretic.   HENT:      Head: Normocephalic and atraumatic.      Right Ear: External ear normal.      Left Ear: External ear normal.      Nose: Nose normal. No congestion.      Mouth/Throat:      Pharynx: Oropharynx is clear.   Eyes:      General: No scleral icterus.     Extraocular Movements: Extraocular movements intact.   Cardiovascular:      Rate and Rhythm: Normal rate and regular rhythm.      Pulses: Normal pulses.      Heart sounds: Normal heart sounds. No murmur heard.  Pulmonary:      Effort: Pulmonary effort is normal. No respiratory distress.      Breath sounds: Normal breath sounds. No wheezing or rales.   Abdominal:      General: Bowel sounds are normal. There is no distension.      Palpations: Abdomen is soft.      Tenderness: There is no abdominal tenderness. There is right CVA tenderness. There is no guarding or rebound.   Musculoskeletal:      Cervical back: Normal range of motion.      Right lower leg: No edema.      Left lower leg: No edema.   Skin:     General: Skin is warm and dry.      Capillary Refill: Capillary refill takes less than 2 seconds.   Neurological:      General: No focal deficit present.      Mental Status: She is alert and oriented to person, place, and time. Mental status is at baseline.   Psychiatric:         Mood and Affect: Mood normal.         Behavior: Behavior normal.         Thought Content: Thought content normal.                Significant Labs: All pertinent labs within the past 24 hours have been reviewed.  CBC:   Recent Labs   Lab 09/27/24  0838   WBC  12.05   HGB 13.3   HCT 41.7        CMP:   Recent Labs   Lab 09/27/24  0838      K 3.4*      CO2 20*      BUN 8   CREATININE 0.8   CALCIUM 9.7   PROT 8.1   ALBUMIN 4.4   BILITOT 0.6   ALKPHOS 50*   AST 12   ALT 8*   ANIONGAP 13     Urine Studies:   Recent Labs   Lab 09/27/24  1000   COLORU Yellow   APPEARANCEUA Hazy*   PHUR 6.0   SPECGRAV 1.010   PROTEINUA Negative   GLUCUA Negative   KETONESU Negative   BILIRUBINUA Negative   OCCULTUA Negative   NITRITE Positive*   LEUKOCYTESUR 2+*   RBCUA 2   WBCUA 16*   BACTERIA Few*   SQUAMEPITHEL 1       Significant Imaging: I have reviewed all pertinent imaging results/findings within the past 24 hours.  Imaging Results               CT Abdomen Pelvis With IV Contrast NO Oral Contrast (Final result)  Result time 09/27/24 11:59:43      Final result by Nii Pizarro MD (09/27/24 11:59:43)                   Impression:      Findings suggestive of pyelonephritis with phlegmonous change/possible early abscess formation, as above.  Recommend further evaluation with ultrasound to assess abnormal area for fluid collection.    Trace free fluid in the pelvis, possibly physiologic.    This report was flagged in Epic as abnormal.    Electronically signed by resident: Nova Vides  Date:    09/27/2024  Time:    11:05    Electronically signed by: Nii Pizarro MD  Date:    09/27/2024  Time:    11:59               Narrative:    EXAMINATION:  CT ABDOMEN PELVIS WITH IV CONTRAST    CLINICAL HISTORY:  RLQ abdominal pain (Age >= 14y);    TECHNIQUE:  Axial images of the abdomen and pelvis were acquired after the use of 75 cc [id number] IV contrast.  Coronal and sagittal reconstructions were also obtained    COMPARISON:  Ultrasound 09/27/2024    FINDINGS:  Thoracic soft tissues: Partially visualized thoracic soft tissues appear unremarkable.    Heart: No pericardial effusion.    Lungs: Lung bases are clear.  No pleural effusion.    Esophagus: Unremarkable.    Stomach and  duodenum: Unremarkable.    Liver: Normal in size and contour.  No focal hepatic lesion.    Gallbladder: No calcified gallstones.    Bile Ducts: No evidence of dilated ducts.    Spleen: Unremarkable.    Pancreas: No mass or peripancreatic fat stranding.    Adrenals: Unremarkable.    Kidneys/Ureters: Ill-defined area of hypodensity extending from the central kidney to the periphery with some extension beyond the renal parenchyma (601-53).  Finding is nonspecific but is concerning for pyelonephritis with phlegmonous change/possible early abscess formation.  Additional small hypodensity in the right kidney, too small to characterize.  No hydronephrosis or nephrolithiasis. No ureteral dilatation.    Bladder: No evidence of wall thickening.    Reproductive organs: Unremarkable.    Bowel/Mesentery: Small bowel is normal in caliber, no evidence of obstruction.  No evidence of inflammation.  Colon appears normal without apparent wall thickening or inflammatory change.  Appendix is normal.    Peritoneum: Trace free fluid in the pelvis.  No free air.    Lymph nodes: No lymphadenopathy.    Abdominal wall:  Unremarkable.    Vasculature: No aneurysm. No significant calcific atherosclerosis.    Bones: No acute fracture. No suspicious osseous lesions.                                       US Abdomen Limited (Final result)  Result time 09/27/24 10:17:53      Final result by Dimas Reilly MD (09/27/24 10:17:53)                   Impression:      No significant sonographic abnormality.      Electronically signed by: Dimas Reilly  Date:    09/27/2024  Time:    10:17               Narrative:    EXAMINATION:  US ABDOMEN LIMITED    CLINICAL HISTORY:  Concern for cholelithiasis/cholecystitis.;    TECHNIQUE:  Limited ultrasound of the right upper quadrant of the abdomen focused on the biliary system was performed.    COMPARISON:  None    FINDINGS:  Pancreas: Visualized portions appear normal.    Gallbladder: No calculi, wall thickening,  or pericholecystic fluid.  No sonographic Arreola's sign.    Biliary system: The common duct measures 2 mm. No intrahepatic ductal dilatation.    Miscellaneous: No abnormalities in the visualized liver or right kidney. No ascites.                                     Assessment/Plan:     * Pyelonephritis  Nausea    - AFVSS on RA  - No leukocytosis  - UA grossly infectious, 2+ leukocytes, 16 WBC  - UC pending  - CT a/p: Findings suggestive of pyelonephritis with phlegmonous change/possible early abscess formation. Trace free fluid in the pelvis, possibly physiologic.  - Abd US: No significant sonographic abnormality.   - s/p 1 L LR  - tylenol 1 g tid, toradol IV prn  - continue rocephin   - antiemetics prn  - daily CBC    Hypokalemia  Patient's most recent potassium results are listed below.   Recent Labs     09/27/24  0838   K 3.4*     Plan  - Replete potassium per protocol  - Monitor potassium Daily  - Patient's hypokalemia is stable      VTE Risk Mitigation (From admission, onward)           Ordered     enoxaparin injection 40 mg  Daily         09/27/24 1359     IP VTE HIGH RISK PATIENT  Once         09/27/24 1359     Place sequential compression device  Until discontinued         09/27/24 1359                         On 09/27/2024, patient should be placed in hospital observation services under my care in collaboration with Dr. Forman.           Jyotsna Ramsey PA-C  Department of Hospital Medicine  [facility] - Emergency Dept

## 2024-09-27 NOTE — ED NOTES
"Pt presents to ED via personal transport w/ Aunt w/ c/o acute onset of RUQ abdominal pain that started last night. Tender to palpation. No precipitating factors. Pt reports difficulty w/ PO intake d/t pain and lack of appetite. Endorsing intermittent nausea and "hot flashes". Also endorsing subjective fever ar home last night. Pt reporting 8/10 at this time. Denies chest pain, shortness of breath, headache, vomiting, chills, or any other sxs.    Patient identifiers for Hema Whelannora 19 y.o. female checked and correct.  Chief Complaint   Patient presents with    Abdominal Pain     RLQ pain started yesterday morning. +nausea. Denies V/D.      No past medical history on file.  Allergies reported:   Review of patient's allergies indicates:   Allergen Reactions    Cat/feline products Hives     "

## 2024-09-27 NOTE — ASSESSMENT & PLAN NOTE
Patient's most recent potassium results are listed below.   Recent Labs     09/27/24  0838   K 3.4*     Plan  - Replete potassium per protocol  - Monitor potassium Daily  - Patient's hypokalemia is stable

## 2024-09-27 NOTE — HPI
Ms. Hema Riley is a 19-year-old female with no significant past medical history who presents due to back pain, nausea and general unwell feeling. She notes that the symptoms started yesterday and since that time she has not been able to tolerate oral intake. She has not had any episodes of emesis but still feels nauseous. Notes that the pain in the right upper back of her abdomen developed starting earlier this morning which is what prompted her to come into the emergency department.  Has not had any problems with bowel movements or urination. Notes that she has not had any fevers at home but that she has felt diaphoretic/hot and cold flashes while at home.  Prior to the onset of the symptoms she was in her baseline state of health.    In ED, AFVSS on RA. No leukocytosis. K 3.4. UA grossly infectious, 2+ leukocytes, 16 WBC. CT a/p: Findings suggestive of pyelonephritis with phlegmonous change/possible early abscess formation. Trace free fluid in the pelvis, possibly physiologic. Given tylenol, rocephin and LR bolus. Admitted to  for pyelonephritis.

## 2024-09-27 NOTE — ASSESSMENT & PLAN NOTE
Nausea    - AFVSS on RA  - No leukocytosis  - UA grossly infectious, 2+ leukocytes, 16 WBC  - UC pending  - CT a/p: Findings suggestive of pyelonephritis with phlegmonous change/possible early abscess formation. Trace free fluid in the pelvis, possibly physiologic.  - Abd US: No significant sonographic abnormality.   - s/p 1 L LR  - tylenol 1 g tid, toradol IV prn  - continue rocephin   - antiemetics prn  - daily CBC

## 2024-09-27 NOTE — PLAN OF CARE
[facility] - Emergency Dept  Initial Discharge Assessment       Primary Care Provider: Elizabeth Guadarrama MD    Admission Diagnosis: Pyelonephritis [N12]    Admission Date: 9/27/2024  Expected Discharge Date:     Pt stated she is independent with her ambulation and ADL's and does not require assistance or equipment    Pt to d/c home with no needs when ready    Transition of Care Barriers: (P) None    Payor: MEDICAID / Plan: [place] [employer] / Product Type: Managed Medicaid /     Extended Emergency Contact Information  Primary Emergency Contact: Mariann Riley  Address: [address] [place] [address] [facility]  Home Phone: [phone]  Relation: Mother    Discharge Plan A: (P) Home  Discharge Plan B: (P) Home      [id number] [place] - [address]  [address]  [place] [id number]  Phone: [phone] Fax: [phone]      Initial Assessment (most recent)       Adult Discharge Assessment - 09/27/24 1340          Discharge Assessment    Assessment Type Discharge Planning Assessment (P)      Confirmed/corrected address, phone number and insurance Yes (P)      Confirmed Demographics Correct on Facesheet (P)      Source of Information patient (P)      People in Home spouse (P)      Facility Arrived From: home (P)      Do you expect to return to your current living situation? Yes (P)      Do you have help at home or someone to help you manage your care at home? No (P)      Prior to hospitilization cognitive status: Alert/Oriented;No Deficits (P)      Current cognitive status: Alert/Oriented;No Deficits (P)      Walking or Climbing Stairs Difficulty no (P)      Dressing/Bathing Difficulty no (P)      Home Accessibility not wheelchair accessible;stairs to enter home (P)      Number of Stairs, Main Entrance other (see comments) (P)    2nd story apartment, no elevator 15-20 steps to enter    Home Layout Able to live on 1st floor (P)      Equipment Currently Used at Home none (P)       Patient currently being followed by outpatient case management? No (P)      Do you currently have service(s) that help you manage your care at home? No (P)      Do you have any problems affording any of your prescribed medications? No (P)      Is the patient taking medications as prescribed? yes (P)      Who is going to help you get home at discharge? family/friends (P)      How do you get to doctors appointments? car, drives self (P)      Are you on dialysis? No (P)      Do you take coumadin? No (P)      Discharge Plan A Home (P)      Discharge Plan B Home (P)      DME Needed Upon Discharge  none (P)      Discharge Plan discussed with: Patient (P)      Transition of Care Barriers None (P)         Physical Activity    On average, how many days per week do you engage in moderate to strenuous exercise (like a brisk walk)? 0 days (P)      On average, how many minutes do you engage in exercise at this level? 0 min (P)         Financial Resource Strain    How hard is it for you to pay for the very basics like food, housing, medical care, and heating? Not very hard (P)         Housing Stability    In the last 12 months, was there a time when you were not able to pay the mortgage or rent on time? No (P)      At any time in the past 12 months, were you homeless or living in a shelter (including now)? No (P)         Transportation Needs    Has the lack of transportation kept you from medical appointments, meetings, work or from getting things needed for daily living? No (P)         Food Insecurity    Within the past 12 months, you worried that your food would run out before you got the money to buy more. Never true (P)      Within the past 12 months, the food you bought just didn't last and you didn't have money to get more. Never true (P)         Stress    Do you feel stress - tense, restless, nervous, or anxious, or unable to sleep at night because your mind is troubled all the time - these days? Only a little (P)          Social Isolation    How often do you feel lonely or isolated from those around you?  Never (P)         Alcohol Use    Q1: How often do you have a drink containing alcohol? Never (P)      Q2: How many drinks containing alcohol do you have on a typical day when you are drinking? Patient does not drink (P)      Q3: How often do you have six or more drinks on one occasion? Never (P)         Utilities    In the past 12 months has the electric, gas, oil, or water company threatened to shut off services in your home? No (P)         Health Literacy    How often do you need to have someone help you when you read instructions, pamphlets, or other written material from your doctor or pharmacy? Rarely (P)         OTHER    Name(s) of People in Home spouse Noecan (P)                         Nikia Drake CD, MSW, LMSW, RSW   Case Management  [facility]  Email: [email or web address]

## 2024-09-28 PROBLEM — E87.6 HYPOKALEMIA: Status: RESOLVED | Noted: 2024-09-27 | Resolved: 2024-09-28

## 2024-09-28 LAB
ANION GAP SERPL CALC-SCNC: 6 MMOL/L (ref 8–16)
BUN SERPL-MCNC: 7 MG/DL (ref 6–20)
CALCIUM SERPL-MCNC: 8.8 MG/DL (ref 8.7–10.5)
CHLORIDE SERPL-SCNC: 112 MMOL/L (ref 95–110)
CO2 SERPL-SCNC: 21 MMOL/L (ref 23–29)
CREAT SERPL-MCNC: 0.7 MG/DL (ref 0.5–1.4)
ERYTHROCYTE [DISTWIDTH] IN BLOOD BY AUTOMATED COUNT: 12.2 % (ref 11.5–14.5)
EST. GFR  (NO RACE VARIABLE): >60 ML/MIN/1.73 M^2
GLUCOSE SERPL-MCNC: 86 MG/DL (ref 70–110)
HCT VFR BLD AUTO: 35.1 % (ref 37–48.5)
HGB BLD-MCNC: 11.2 G/DL (ref 12–16)
MAGNESIUM SERPL-MCNC: 1.9 MG/DL (ref 1.6–2.6)
MCH RBC QN AUTO: 27.3 PG (ref 27–31)
MCHC RBC AUTO-ENTMCNC: 31.9 G/DL (ref 32–36)
MCV RBC AUTO: 86 FL (ref 82–98)
PLATELET # BLD AUTO: 172 K/UL (ref 150–450)
PMV BLD AUTO: 11.3 FL (ref 9.2–12.9)
POTASSIUM SERPL-SCNC: 4.1 MMOL/L (ref 3.5–5.1)
RBC # BLD AUTO: 4.1 M/UL (ref 4–5.4)
SODIUM SERPL-SCNC: 139 MMOL/L (ref 136–145)
WBC # BLD AUTO: 10.18 K/UL (ref 3.9–12.7)

## 2024-09-28 PROCEDURE — 63600175 PHARM REV CODE 636 W HCPCS: Performed by: PHYSICIAN ASSISTANT

## 2024-09-28 PROCEDURE — 83735 ASSAY OF MAGNESIUM: CPT | Performed by: STUDENT IN AN ORGANIZED HEALTH CARE EDUCATION/TRAINING PROGRAM

## 2024-09-28 PROCEDURE — G0378 HOSPITAL OBSERVATION PER HR: HCPCS

## 2024-09-28 PROCEDURE — 85027 COMPLETE CBC AUTOMATED: CPT | Performed by: STUDENT IN AN ORGANIZED HEALTH CARE EDUCATION/TRAINING PROGRAM

## 2024-09-28 PROCEDURE — 63600175 PHARM REV CODE 636 W HCPCS: Performed by: STUDENT IN AN ORGANIZED HEALTH CARE EDUCATION/TRAINING PROGRAM

## 2024-09-28 PROCEDURE — 11000001 HC ACUTE MED/SURG PRIVATE ROOM

## 2024-09-28 PROCEDURE — 80048 BASIC METABOLIC PNL TOTAL CA: CPT | Performed by: STUDENT IN AN ORGANIZED HEALTH CARE EDUCATION/TRAINING PROGRAM

## 2024-09-28 PROCEDURE — 36415 COLL VENOUS BLD VENIPUNCTURE: CPT | Performed by: STUDENT IN AN ORGANIZED HEALTH CARE EDUCATION/TRAINING PROGRAM

## 2024-09-28 PROCEDURE — A4216 STERILE WATER/SALINE, 10 ML: HCPCS | Performed by: PHYSICIAN ASSISTANT

## 2024-09-28 PROCEDURE — 25000003 PHARM REV CODE 250: Performed by: PHYSICIAN ASSISTANT

## 2024-09-28 RX ORDER — SODIUM CHLORIDE, SODIUM LACTATE, POTASSIUM CHLORIDE, CALCIUM CHLORIDE 600; 310; 30; 20 MG/100ML; MG/100ML; MG/100ML; MG/100ML
INJECTION, SOLUTION INTRAVENOUS CONTINUOUS
Status: DISCONTINUED | OUTPATIENT
Start: 2024-09-28 | End: 2024-09-28

## 2024-09-28 RX ORDER — OXYCODONE HYDROCHLORIDE 5 MG/1
5 TABLET ORAL EVERY 6 HOURS PRN
Status: DISCONTINUED | OUTPATIENT
Start: 2024-09-28 | End: 2024-09-28

## 2024-09-28 RX ADMIN — SODIUM CHLORIDE, POTASSIUM CHLORIDE, SODIUM LACTATE AND CALCIUM CHLORIDE 1000 ML: 600; 310; 30; 20 INJECTION, SOLUTION INTRAVENOUS at 09:09

## 2024-09-28 RX ADMIN — ACETAMINOPHEN 1000 MG: 500 TABLET ORAL at 01:09

## 2024-09-28 RX ADMIN — ACETAMINOPHEN 1000 MG: 500 TABLET ORAL at 04:09

## 2024-09-28 RX ADMIN — SODIUM CHLORIDE, POTASSIUM CHLORIDE, SODIUM LACTATE AND CALCIUM CHLORIDE: 600; 310; 30; 20 INJECTION, SOLUTION INTRAVENOUS at 10:09

## 2024-09-28 RX ADMIN — SODIUM CHLORIDE, SODIUM LACTATE, POTASSIUM CHLORIDE, AND CALCIUM CHLORIDE: 600; 310; 30; 20 INJECTION, SOLUTION INTRAVENOUS at 01:09

## 2024-09-28 RX ADMIN — Medication 10 ML: at 01:09

## 2024-09-28 RX ADMIN — KETOROLAC TROMETHAMINE 15 MG: 30 INJECTION, SOLUTION INTRAMUSCULAR; INTRAVENOUS at 01:09

## 2024-09-28 RX ADMIN — KETOROLAC TROMETHAMINE 15 MG: 30 INJECTION, SOLUTION INTRAMUSCULAR; INTRAVENOUS at 08:09

## 2024-09-28 RX ADMIN — CEFTRIAXONE 1 G: 1 INJECTION, POWDER, FOR SOLUTION INTRAMUSCULAR; INTRAVENOUS at 01:09

## 2024-09-28 RX ADMIN — SODIUM CHLORIDE, POTASSIUM CHLORIDE, SODIUM LACTATE AND CALCIUM CHLORIDE: 600; 310; 30; 20 INJECTION, SOLUTION INTRAVENOUS at 01:09

## 2024-09-28 NOTE — PLAN OF CARE
Problem: Adult Inpatient Plan of Care  Goal: Plan of Care Review  Outcome: Progressing  Goal: Patient-Specific Goal (Individualized)  Outcome: Progressing  Goal: Absence of Hospital-Acquired Illness or Injury  Outcome: Progressing  Goal: Optimal Comfort and Wellbeing  Outcome: Progressing  Goal: Readiness for Transition of Care  Outcome: Progressing     Problem: Infection  Goal: Absence of Infection Signs and Symptoms  Outcome: Progressing   Patient AAOx4.VSS.Nad. IV fluids and antibiotics infusing per mar. Q2 rounding completed. Safety maintained. Will continue to monitor.

## 2024-09-28 NOTE — SUBJECTIVE & OBJECTIVE
Interval History: Admitted yesterday for pyelonephritis. Started on mIVF & CTX. Afebrile & no leukocytosis but soft BP (90s-100s/60s), but w/ charted hypotension charted overnight (68/35) however apparently pt has asleep and had legs crossed at that time; BP 96/60 on recheck. No dizziness/lightheadedness, chest pain or SOB. Reports some continued R flank & RUQ pain with movement, but no nausea/vomiting, dysuria, etc. Urine C&S pending. Giving addn'l IVF and continuing with CTX. Retroperitoneal US pending. Plan discussed w/ pt and partner at bedside.     Review of Systems   Constitutional:  Negative for chills, diaphoresis and fever.   HENT:  Negative for congestion, rhinorrhea, sore throat and trouble swallowing.    Eyes:  Negative for photophobia and visual disturbance.   Respiratory:  Negative for cough, shortness of breath and wheezing.    Cardiovascular:  Negative for chest pain, palpitations and leg swelling.   Gastrointestinal:  Positive for abdominal pain (RUQ). Negative for constipation, diarrhea, nausea and vomiting.   Genitourinary:  Positive for flank pain (R). Negative for difficulty urinating, dysuria and hematuria.   Musculoskeletal:  Negative for arthralgias, joint swelling and neck pain.   Skin:  Negative for rash and wound.   Neurological:  Negative for dizziness, light-headedness and headaches.   Psychiatric/Behavioral:  Negative for agitation, confusion and sleep disturbance.      Objective:     Vital Signs (Most Recent):  Temp: 98.3 °F (36.8 °C) (09/28/24 0720)  Pulse: 75 (09/28/24 0720)  Resp: 18 (09/28/24 0720)  BP: (!) 88/52 (09/28/24 0720)  SpO2: 99 % (09/28/24 0720) Vital Signs (24h Range):  Temp:  [97.6 °F (36.4 °C)-98.3 °F (36.8 °C)] 98.3 °F (36.8 °C)  Pulse:  [75-90] 75  Resp:  [18-20] 18  SpO2:  [98 %-100 %] 99 %  BP: ()/(35-68) 88/52     Weight: 52.2 kg (115 lb)  Body mass index is 21.73 kg/m².  No intake or output data in the 24 hours ending 09/28/24 1831      Physical  Exam  Constitutional:       General: She is not in acute distress.     Appearance: She is normal weight. She is not toxic-appearing or diaphoretic.   HENT:      Head: Normocephalic and atraumatic.      Mouth/Throat:      Mouth: Mucous membranes are moist.   Eyes:      Conjunctiva/sclera: Conjunctivae normal.   Cardiovascular:      Rate and Rhythm: Normal rate and regular rhythm.      Heart sounds: Normal heart sounds.   Pulmonary:      Effort: Pulmonary effort is normal. No respiratory distress.      Breath sounds: Normal breath sounds. No wheezing, rhonchi or rales.   Abdominal:      General: Bowel sounds are normal. There is no distension.      Palpations: Abdomen is soft.      Tenderness: There is no abdominal tenderness. There is no right CVA tenderness, left CVA tenderness or guarding.   Musculoskeletal:      Right lower leg: No edema.      Left lower leg: No edema.   Skin:     General: Skin is warm and dry.   Neurological:      General: No focal deficit present.      Mental Status: She is alert and oriented to person, place, and time. Mental status is at baseline.   Psychiatric:         Mood and Affect: Mood normal.         Behavior: Behavior normal.           Significant Labs: All pertinent labs within the past 24 hours have been reviewed.    Significant Imaging: I have reviewed all pertinent imaging results/findings within the past 24 hours.

## 2024-09-28 NOTE — PROGRESS NOTES
[facility] - Internal Medicine [facility] Medicine  Progress Note    Patient Name: Hema Riley  MRN: [id number]  Patient Class: OP- Observation   Admission Date: 9/27/2024  Length of Stay: 0 days  Attending Physician: Enedelia Forman MD  Primary Care Provider: Elizabeth Guadarrama MD    Subjective:     Principal Problem: Pyelonephritis    HPI:  Ms. Hema Riley is a 19-year-old female with no significant past medical history who presents due to back pain, nausea and general unwell feeling. She notes that the symptoms started yesterday and since that time she has not been able to tolerate oral intake. She has not had any episodes of emesis but still feels nauseous. Notes that the pain in the right upper back of her abdomen developed starting earlier this morning which is what prompted her to come into the emergency department.  Has not had any problems with bowel movements or urination. Notes that she has not had any fevers at home but that she has felt diaphoretic/hot and cold flashes while at home.  Prior to the onset of the symptoms she was in her baseline state of health.    In ED, AFVSS on RA. No leukocytosis. K 3.4. UA grossly infectious, 2+ leukocytes, 16 WBC. CT a/p: Findings suggestive of pyelonephritis with phlegmonous change/possible early abscess formation. Trace free fluid in the pelvis, possibly physiologic. Given tylenol, rocephin and LR bolus. Admitted to  for pyelonephritis.    Overview/Hospital Course:  As below.     Interval History: Admitted yesterday for pyelonephritis. Started on mIVF & CTX. Afebrile & no leukocytosis but soft BP (90s-100s/60s), but w/ charted hypotension charted overnight (68/35) however apparently pt has asleep and had legs crossed at that time; BP 96/60 on recheck. No dizziness/lightheadedness, chest pain or SOB. Reports some continued R flank & RUQ pain with movement, but no nausea/vomiting, dysuria, etc. Urine C&S pending. Giving addn'l IVF and continuing with  CTX. Retroperitoneal US pending. Plan discussed w/ pt and partner at bedside.     Review of Systems   Constitutional:  Negative for chills, diaphoresis and fever.   HENT:  Negative for congestion, rhinorrhea, sore throat and trouble swallowing.    Eyes:  Negative for photophobia and visual disturbance.   Respiratory:  Negative for cough, shortness of breath and wheezing.    Cardiovascular:  Negative for chest pain, palpitations and leg swelling.   Gastrointestinal:  Positive for abdominal pain (RUQ). Negative for constipation, diarrhea, nausea and vomiting.   Genitourinary:  Positive for flank pain (R). Negative for difficulty urinating, dysuria and hematuria.   Musculoskeletal:  Negative for arthralgias, joint swelling and neck pain.   Skin:  Negative for rash and wound.   Neurological:  Negative for dizziness, light-headedness and headaches.   Psychiatric/Behavioral:  Negative for agitation, confusion and sleep disturbance.      Objective:     Vital Signs (Most Recent):  Temp: 98.3 °F (36.8 °C) (09/28/24 0720)  Pulse: 75 (09/28/24 0720)  Resp: 18 (09/28/24 0720)  BP: (!) 88/52 (09/28/24 0720)  SpO2: 99 % (09/28/24 0720) Vital Signs (24h Range):  Temp:  [97.6 °F (36.4 °C)-98.3 °F (36.8 °C)] 98.3 °F (36.8 °C)  Pulse:  [75-90] 75  Resp:  [18-20] 18  SpO2:  [98 %-100 %] 99 %  BP: ()/(35-68) 88/52     Weight: 52.2 kg (115 lb)  Body mass index is 21.73 kg/m².  No intake or output data in the 24 hours ending 09/28/24 0933      Physical Exam  Constitutional:       General: She is not in acute distress.     Appearance: She is normal weight. She is not toxic-appearing or diaphoretic.   HENT:      Head: Normocephalic and atraumatic.      Mouth/Throat:      Mouth: Mucous membranes are moist.   Eyes:      Conjunctiva/sclera: Conjunctivae normal.   Cardiovascular:      Rate and Rhythm: Normal rate and regular rhythm.      Heart sounds: Normal heart sounds.   Pulmonary:      Effort: Pulmonary effort is normal. No  respiratory distress.      Breath sounds: Normal breath sounds. No wheezing, rhonchi or rales.   Abdominal:      General: Bowel sounds are normal. There is no distension.      Palpations: Abdomen is soft.      Tenderness: There is no abdominal tenderness. There is no right CVA tenderness, left CVA tenderness or guarding.   Musculoskeletal:      Right lower leg: No edema.      Left lower leg: No edema.   Skin:     General: Skin is warm and dry.   Neurological:      General: No focal deficit present.      Mental Status: She is alert and oriented to person, place, and time. Mental status is at baseline.   Psychiatric:         Mood and Affect: Mood normal.         Behavior: Behavior normal.           Significant Labs: All pertinent labs within the past 24 hours have been reviewed.    Significant Imaging: I have reviewed all pertinent imaging results/findings within the past 24 hours.    Assessment/Plan:      * Pyelonephritis  Presented w/ nausea, malaise, RUQ & R flank pain. Meeting 1/4 SIRS (HR) on admission w/ soft BP (90s-100s). UA w/ few bacteria, 2+ LE, positive nitrites, & 16 WBC. Abdominal US unremarkable, but CT w/ e/o pyelonephritis with phlegmonous change/possible early abscess formation.   - Continue CTX  - IVF  - Retroperitoneal US pending  - Urine C&S pending  - Supportive care (analgesics, antiemetics, etc)         VTE Risk Mitigation (From admission, onward)           Ordered     enoxaparin injection 40 mg  Daily         09/27/24 1359     IP VTE HIGH RISK PATIENT  Once         09/27/24 1359     Place sequential compression device  Until discontinued         09/27/24 1359                    Discharge Planning   GERI: 9/29/2024     Code Status: Full Code   Is the patient medically ready for discharge?:     Reason for patient still in hospital (select all that apply): Patient trending condition, Laboratory test, Treatment, and Imaging  Discharge Plan A: Home          Enedelia Forman MD  Department of  [facility] Medicine   [facility] - Internal Medicine Telemetry

## 2024-09-28 NOTE — ASSESSMENT & PLAN NOTE
Presented w/ nausea, malaise, RUQ & R flank pain. Meeting 1/4 SIRS (HR) on admission w/ soft BP (90s-100s). UA w/ few bacteria, 2+ LE, positive nitrites, & 16 WBC. Abdominal US unremarkable, but CT w/ e/o pyelonephritis with phlegmonous change/possible early abscess formation.   - Continue CTX  - IVF  - Retroperitoneal US pending  - Urine C&S pending  - Supportive care (analgesics, antiemetics, etc)

## 2024-09-29 LAB
ANION GAP SERPL CALC-SCNC: 8 MMOL/L (ref 8–16)
BUN SERPL-MCNC: 7 MG/DL (ref 6–20)
CALCIUM SERPL-MCNC: 8.8 MG/DL (ref 8.7–10.5)
CHLORIDE SERPL-SCNC: 112 MMOL/L (ref 95–110)
CO2 SERPL-SCNC: 17 MMOL/L (ref 23–29)
CREAT SERPL-MCNC: 0.6 MG/DL (ref 0.5–1.4)
ERYTHROCYTE [DISTWIDTH] IN BLOOD BY AUTOMATED COUNT: 12.4 % (ref 11.5–14.5)
EST. GFR  (NO RACE VARIABLE): >60 ML/MIN/1.73 M^2
GLUCOSE SERPL-MCNC: 86 MG/DL (ref 70–110)
HCT VFR BLD AUTO: 33.4 % (ref 37–48.5)
HGB BLD-MCNC: 10.7 G/DL (ref 12–16)
MAGNESIUM SERPL-MCNC: 1.6 MG/DL (ref 1.6–2.6)
MCH RBC QN AUTO: 27.4 PG (ref 27–31)
MCHC RBC AUTO-ENTMCNC: 32 G/DL (ref 32–36)
MCV RBC AUTO: 85 FL (ref 82–98)
PLATELET # BLD AUTO: 174 K/UL (ref 150–450)
PMV BLD AUTO: 11.8 FL (ref 9.2–12.9)
POTASSIUM SERPL-SCNC: 4.3 MMOL/L (ref 3.5–5.1)
RBC # BLD AUTO: 3.91 M/UL (ref 4–5.4)
SODIUM SERPL-SCNC: 137 MMOL/L (ref 136–145)
WBC # BLD AUTO: 9.83 K/UL (ref 3.9–12.7)

## 2024-09-29 PROCEDURE — 36415 COLL VENOUS BLD VENIPUNCTURE: CPT | Performed by: STUDENT IN AN ORGANIZED HEALTH CARE EDUCATION/TRAINING PROGRAM

## 2024-09-29 PROCEDURE — 80048 BASIC METABOLIC PNL TOTAL CA: CPT | Performed by: STUDENT IN AN ORGANIZED HEALTH CARE EDUCATION/TRAINING PROGRAM

## 2024-09-29 PROCEDURE — 83735 ASSAY OF MAGNESIUM: CPT | Performed by: STUDENT IN AN ORGANIZED HEALTH CARE EDUCATION/TRAINING PROGRAM

## 2024-09-29 PROCEDURE — 25000003 PHARM REV CODE 250: Performed by: PHYSICIAN ASSISTANT

## 2024-09-29 PROCEDURE — 85027 COMPLETE CBC AUTOMATED: CPT | Performed by: STUDENT IN AN ORGANIZED HEALTH CARE EDUCATION/TRAINING PROGRAM

## 2024-09-29 PROCEDURE — 63600175 PHARM REV CODE 636 W HCPCS: Performed by: PHYSICIAN ASSISTANT

## 2024-09-29 PROCEDURE — 51798 US URINE CAPACITY MEASURE: CPT

## 2024-09-29 PROCEDURE — G0378 HOSPITAL OBSERVATION PER HR: HCPCS

## 2024-09-29 PROCEDURE — A4216 STERILE WATER/SALINE, 10 ML: HCPCS | Performed by: PHYSICIAN ASSISTANT

## 2024-09-29 PROCEDURE — 11000001 HC ACUTE MED/SURG PRIVATE ROOM

## 2024-09-29 PROCEDURE — 63600175 PHARM REV CODE 636 W HCPCS: Performed by: STUDENT IN AN ORGANIZED HEALTH CARE EDUCATION/TRAINING PROGRAM

## 2024-09-29 PROCEDURE — 99222 1ST HOSP IP/OBS MODERATE 55: CPT | Mod: ,,, | Performed by: UROLOGY

## 2024-09-29 RX ORDER — ACETAMINOPHEN 325 MG/1
650 TABLET ORAL EVERY 4 HOURS PRN
Status: DISCONTINUED | OUTPATIENT
Start: 2024-09-29 | End: 2024-09-30 | Stop reason: HOSPADM

## 2024-09-29 RX ORDER — MAGNESIUM SULFATE HEPTAHYDRATE 40 MG/ML
2 INJECTION, SOLUTION INTRAVENOUS ONCE
Status: COMPLETED | OUTPATIENT
Start: 2024-09-29 | End: 2024-09-29

## 2024-09-29 RX ORDER — SODIUM CHLORIDE, SODIUM LACTATE, POTASSIUM CHLORIDE, CALCIUM CHLORIDE 600; 310; 30; 20 MG/100ML; MG/100ML; MG/100ML; MG/100ML
INJECTION, SOLUTION INTRAVENOUS CONTINUOUS
Status: DISCONTINUED | OUTPATIENT
Start: 2024-09-29 | End: 2024-09-30 | Stop reason: HOSPADM

## 2024-09-29 RX ADMIN — MAGNESIUM SULFATE HEPTAHYDRATE 2 G: 40 INJECTION, SOLUTION INTRAVENOUS at 09:09

## 2024-09-29 RX ADMIN — KETOROLAC TROMETHAMINE 15 MG: 30 INJECTION, SOLUTION INTRAMUSCULAR; INTRAVENOUS at 02:09

## 2024-09-29 RX ADMIN — SODIUM CHLORIDE, POTASSIUM CHLORIDE, SODIUM LACTATE AND CALCIUM CHLORIDE: 600; 310; 30; 20 INJECTION, SOLUTION INTRAVENOUS at 02:09

## 2024-09-29 RX ADMIN — CEFTRIAXONE 1 G: 1 INJECTION, POWDER, FOR SOLUTION INTRAMUSCULAR; INTRAVENOUS at 11:09

## 2024-09-29 RX ADMIN — ACETAMINOPHEN 1000 MG: 500 TABLET ORAL at 06:09

## 2024-09-29 RX ADMIN — Medication 10 ML: at 09:09

## 2024-09-29 RX ADMIN — KETOROLAC TROMETHAMINE 15 MG: 30 INJECTION, SOLUTION INTRAMUSCULAR; INTRAVENOUS at 09:09

## 2024-09-29 RX ADMIN — Medication 10 ML: at 06:09

## 2024-09-29 NOTE — ASSESSMENT & PLAN NOTE
I have reviewed hospital notes from   service and other specialty providers. I have also reviewed CBC, CMP/BMP,  cultures and imaging with my interpretation as documented.      19F, with unremarkable PMH - admitted 09/27 for pyel, with subjective fevers / chills, and Rt flank pain.   UA wbc 16, few bacteria; Ucx +GNR. Index bld cxs NGTD.  Pt maintained on Iv-ceftriaxone. Remains AF, VSS, wbc nml. Rt flank and abd pain improving.   CT-a/p: revealed Ill-defined area of hypodensity extending from the central kidney to the periphery with some extension beyond the renal parenchyma; c/f Rt sided pyelo with phlegmonous change or possible early abscess formation (not excluded), vs complex cyst.  No stones or hydropnephrosis. F/u RP-U/S also showed: Right-sided upper pole 1.9 cm focus with mixed echogenicity and associated dilation of the upper pole calyx potentially representing pyelonephritis with early/developing acute bacterial nephritis (early abscess not excluded) versus complex cyst.      Recommendations / Plan:  Continue IV-Ceftriaxone.   Will f/u urine cx and adjust abx accordingly. Index bld cxs 09/27 NGTD.   Recommend urology consult for eval and review imaging c/f Rt pyelo with possible early abscess formation.  Anticipate abx duration of 14d      -- Discussed with ID staff and primary team   -- ID will continue to follow w/ further recs.

## 2024-09-29 NOTE — ASSESSMENT & PLAN NOTE
Presented w/ nausea, malaise, RUQ & R flank pain. Meeting 1/4 SIRS (HR) on admission w/ soft BP (90s-100s). UA w/ few bacteria, 2+ LE, positive nitrites, & 16 WBC. Abdominal US unremarkable, but CT w/ e/o pyelonephritis with phlegmonous change/possible early abscess formation. Retroperitoneal US w/ 1.9cm focus of the R upper pole w/ mixed echogenicity & assoc dilation s/o pyelonephritis w/ early/developing acute bacterial nephritis (vs early abscess vs complex cyst).   - ID consulted; appreciate recs  - Urology consulted; appreciate recs  - Continue CTX  - mIVF  - Final urine C&S pending  - Supportive care (analgesics, antiemetics, etc)

## 2024-09-29 NOTE — PLAN OF CARE
Fall precaution maintained this shift call bell in reach. Bed in low position. Instructed pt to call for assistance. No acute distress noted over night. Vs stable. All concerns addressed and answered.     Problem: Adult Inpatient Plan of Care  Goal: Plan of Care Review  Outcome: Progressing  Goal: Patient-Specific Goal (Individualized)  Outcome: Progressing  Goal: Absence of Hospital-Acquired Illness or Injury  Outcome: Progressing  Goal: Optimal Comfort and Wellbeing  Outcome: Progressing  Goal: Readiness for Transition of Care  Outcome: Progressing     Problem: Infection  Goal: Absence of Infection Signs and Symptoms  Outcome: Progressing

## 2024-09-29 NOTE — PLAN OF CARE
Patient had uneventful day. Patient AAOX4 in NAD. VSS on room air. IV fluids initiated as ordered. IV Rocephin continued. Toradol given for pain control, patient with no further complaints. Bed in lowest locked position, side rails up x2 and call bell within reach. Will continue to monitor.    Problem: Adult Inpatient Plan of Care  Goal: Plan of Care Review  Outcome: Progressing     Problem: Infection  Goal: Absence of Infection Signs and Symptoms  Outcome: Progressing     Problem: Pain Acute  Goal: Optimal Pain Control and Function  Outcome: Progressing

## 2024-09-29 NOTE — PROGRESS NOTES
[facility] - Internal Medicine [facility]  Progress Note    Patient Name: Hema Riley  MRN: [id number]  Patient Class: OP- Observation   Admission Date: 9/27/2024  Length of Stay: 0 days  Attending Physician: Enedelia Forman MD  Primary Care Provider: Elizabeth Guadarrama MD    Subjective:     Principal Problem: Pyelonephritis    HPI:  Ms. Hema Riley is a 19-year-old female with no significant past medical history who presents due to back pain, nausea and general unwell feeling. She notes that the symptoms started yesterday and since that time she has not been able to tolerate oral intake. She has not had any episodes of emesis but still feels nauseous. Notes that the pain in the right upper back of her abdomen developed starting earlier this morning which is what prompted her to come into the emergency department.  Has not had any problems with bowel movements or urination. Notes that she has not had any fevers at home but that she has felt diaphoretic/hot and cold flashes while at home.  Prior to the onset of the symptoms she was in her baseline state of health.    In ED, AFVSS on RA. No leukocytosis. K 3.4. UA grossly infectious, 2+ leukocytes, 16 WBC. CT a/p: Findings suggestive of pyelonephritis with phlegmonous change/possible early abscess formation. Trace free fluid in the pelvis, possibly physiologic. Given tylenol, rocephin and LR bolus. Admitted to  for pyelonephritis.    Overview/Hospital Course:  Admitted for pyelonephritis. Started on mIVF & CTX. Afebrile & no leukocytosis but soft BP (90s-100s/60s), but asymptomatic. Improvement of R flank & RUQ pain. Retroperitoneal US showed a 1.9 cm focus of the R upper pole w/ mixed echogenicity & assoc dilation of the upper pole calyx possibly s/o pyelonephritis w/ early/developing acute bacterial nephritis (vs early abscess vs complex cyst). ID & Urology consulted.     Interval History: NAEON. Remains afebrile & HDS. Reports R flank & RUQ  pain continue to improve. No other s/sx. ID & urology consulted. Urine C&S pending. Continuing w/ mIVF & CTX. Plan discussed w/ pt at bedside.     Review of Systems   Constitutional:  Negative for chills, diaphoresis and fever.   HENT:  Negative for congestion, rhinorrhea, sore throat and trouble swallowing.    Eyes:  Negative for photophobia and visual disturbance.   Respiratory:  Negative for cough, shortness of breath and wheezing.    Cardiovascular:  Negative for chest pain, palpitations and leg swelling.   Gastrointestinal:  Positive for abdominal pain (RUQ). Negative for constipation, diarrhea, nausea and vomiting.   Genitourinary:  Positive for flank pain (R). Negative for difficulty urinating, dysuria and hematuria.   Musculoskeletal:  Negative for arthralgias, joint swelling and neck pain.   Skin:  Negative for rash and wound.   Neurological:  Negative for dizziness, light-headedness and headaches.   Psychiatric/Behavioral:  Negative for agitation, confusion and sleep disturbance.        Objective:     Vital Signs (Most Recent):  Temp: 97.9 °F (36.6 °C) (09/29/24 1116)  Pulse: 72 (09/29/24 1116)  Resp: 18 (09/29/24 1116)  BP: (!) 91/53 (09/29/24 1116)  SpO2: 98 % (09/29/24 1116) Vital Signs (24h Range):  Temp:  [97.9 °F (36.6 °C)-98.8 °F (37.1 °C)] 97.9 °F (36.6 °C)  Pulse:  [72-93] 72  Resp:  [18-20] 18  SpO2:  [97 %-100 %] 98 %  BP: ()/(53-71) 91/53     Weight: 52.2 kg (115 lb)  Body mass index is 21.73 kg/m².    Intake/Output Summary (Last 24 hours) at 9/29/2024 1538  Last data filed at 9/29/2024 0652  Gross per 24 hour   Intake 120 ml   Output --   Net 120 ml         Physical Exam  Constitutional:       General: She is not in acute distress.     Appearance: She is normal weight. She is not toxic-appearing or diaphoretic.   HENT:      Head: Normocephalic and atraumatic.      Mouth/Throat:      Mouth: Mucous membranes are moist.   Eyes:      Conjunctiva/sclera: Conjunctivae normal.   Cardiovascular:       Rate and Rhythm: Normal rate and regular rhythm.      Heart sounds: Normal heart sounds.   Pulmonary:      Effort: Pulmonary effort is normal. No respiratory distress.      Breath sounds: Normal breath sounds. No wheezing, rhonchi or rales.   Abdominal:      General: Bowel sounds are normal. There is no distension.      Palpations: Abdomen is soft.      Tenderness: There is no abdominal tenderness. There is no right CVA tenderness, left CVA tenderness or guarding.   Musculoskeletal:      Right lower leg: No edema.      Left lower leg: No edema.   Skin:     General: Skin is warm and dry.   Neurological:      General: No focal deficit present.      Mental Status: She is alert and oriented to person, place, and time. Mental status is at baseline.   Psychiatric:         Mood and Affect: Mood normal.         Behavior: Behavior normal.           Significant Labs: All pertinent labs within the past 24 hours have been reviewed.    Significant Imaging: I have reviewed all pertinent imaging results/findings within the past 24 hours.    Assessment/Plan:      * Pyelonephritis  Presented w/ nausea, malaise, RUQ & R flank pain. Meeting 1/4 SIRS (HR) on admission w/ soft BP (90s-100s). UA w/ few bacteria, 2+ LE, positive nitrites, & 16 WBC. Abdominal US unremarkable, but CT w/ e/o pyelonephritis with phlegmonous change/possible early abscess formation. Retroperitoneal US w/ 1.9cm focus of the R upper pole w/ mixed echogenicity & assoc dilation s/o pyelonephritis w/ early/developing acute bacterial nephritis (vs early abscess vs complex cyst).   - ID consulted; appreciate recs  - Urology consulted; appreciate recs  - Continue CTX  - mIVF  - Final urine C&S pending  - Supportive care (analgesics, antiemetics, etc)       VTE Risk Mitigation (From admission, onward)           Ordered     enoxaparin injection 40 mg  Daily         09/27/24 1359     IP VTE HIGH RISK PATIENT  Once         09/27/24 1359     Place sequential  compression device  Until discontinued         09/27/24 1359                    Discharge Planning   GERI: 10/1/2024     Code Status: Full Code   Is the patient medically ready for discharge?:     Reason for patient still in hospital (select all that apply): Patient trending condition, Laboratory test, and Consult recommendations  Discharge Plan A: Home          Enedelia Forman MD  Department of Hospital Medicine   [facility] - Internal Medicine Telemetry

## 2024-09-29 NOTE — SUBJECTIVE & OBJECTIVE
Interval History: NAEON. Remains afebrile & HDS. Reports R flank & RUQ pain continue to improve. No other s/sx. ID & urology consulted. Urine C&S pending. Continuing w/ mIVF & CTX. Plan discussed w/ pt at bedside.     Review of Systems   Constitutional:  Negative for chills, diaphoresis and fever.   HENT:  Negative for congestion, rhinorrhea, sore throat and trouble swallowing.    Eyes:  Negative for photophobia and visual disturbance.   Respiratory:  Negative for cough, shortness of breath and wheezing.    Cardiovascular:  Negative for chest pain, palpitations and leg swelling.   Gastrointestinal:  Positive for abdominal pain (RUQ). Negative for constipation, diarrhea, nausea and vomiting.   Genitourinary:  Positive for flank pain (R). Negative for difficulty urinating, dysuria and hematuria.   Musculoskeletal:  Negative for arthralgias, joint swelling and neck pain.   Skin:  Negative for rash and wound.   Neurological:  Negative for dizziness, light-headedness and headaches.   Psychiatric/Behavioral:  Negative for agitation, confusion and sleep disturbance.        Objective:     Vital Signs (Most Recent):  Temp: 97.9 °F (36.6 °C) (09/29/24 1116)  Pulse: 72 (09/29/24 1116)  Resp: 18 (09/29/24 1116)  BP: (!) 91/53 (09/29/24 1116)  SpO2: 98 % (09/29/24 1116) Vital Signs (24h Range):  Temp:  [97.9 °F (36.6 °C)-98.8 °F (37.1 °C)] 97.9 °F (36.6 °C)  Pulse:  [72-93] 72  Resp:  [18-20] 18  SpO2:  [97 %-100 %] 98 %  BP: ()/(53-71) 91/53     Weight: 52.2 kg (115 lb)  Body mass index is 21.73 kg/m².    Intake/Output Summary (Last 24 hours) at 9/29/2024 1533  Last data filed at 9/29/2024 0652  Gross per 24 hour   Intake 120 ml   Output --   Net 120 ml         Physical Exam  Constitutional:       General: She is not in acute distress.     Appearance: She is normal weight. She is not toxic-appearing or diaphoretic.   HENT:      Head: Normocephalic and atraumatic.      Mouth/Throat:      Mouth: Mucous membranes are moist.    Eyes:      Conjunctiva/sclera: Conjunctivae normal.   Cardiovascular:      Rate and Rhythm: Normal rate and regular rhythm.      Heart sounds: Normal heart sounds.   Pulmonary:      Effort: Pulmonary effort is normal. No respiratory distress.      Breath sounds: Normal breath sounds. No wheezing, rhonchi or rales.   Abdominal:      General: Bowel sounds are normal. There is no distension.      Palpations: Abdomen is soft.      Tenderness: There is no abdominal tenderness. There is no right CVA tenderness, left CVA tenderness or guarding.   Musculoskeletal:      Right lower leg: No edema.      Left lower leg: No edema.   Skin:     General: Skin is warm and dry.   Neurological:      General: No focal deficit present.      Mental Status: She is alert and oriented to person, place, and time. Mental status is at baseline.   Psychiatric:         Mood and Affect: Mood normal.         Behavior: Behavior normal.           Significant Labs: All pertinent labs within the past 24 hours have been reviewed.    Significant Imaging: I have reviewed all pertinent imaging results/findings within the past 24 hours.

## 2024-09-29 NOTE — CONSULTS
[facility] - Internal Medicine Telemetry  Infectious Disease  Consult Note    Patient Name: Hema Riley  MRN: [id number]  Admission Date: 9/27/2024  Hospital Length of Stay: 0 days  Attending Physician: Enedelia Forman MD  Primary Care Provider: Elizabeth Guadarrama MD     Isolation Status: No active isolations    Patient information was obtained from patient and ER records.      Inpatient consult to Infectious Diseases  Consult performed by: Jacob Griffin PA-C  Consult ordered by: Enedelia Forman MD        Assessment/Plan:     ID  * Pyelonephritis  I have reviewed hospital notes from  HM service and other specialty providers. I have also reviewed CBC, CMP/BMP,  cultures and imaging with my interpretation as documented.      19F, with unremarkable PMH - admitted 09/27 for pyel, with subjective fevers / chills, and Rt flank pain.   UA wbc 16, few bacteria; Ucx +GNR. Index bld cxs NGTD.  Pt maintained on Iv-ceftriaxone. Remains AF, VSS, wbc nml. Rt flank and abd pain improving.   CT-a/p: revealed Ill-defined area of hypodensity extending from the central kidney to the periphery with some extension beyond the renal parenchyma; c/f Rt sided pyelo with phlegmonous change or possible early abscess formation (not excluded), vs complex cyst.  No stones or hydropnephrosis. F/u RP-U/S also showed: Right-sided upper pole 1.9 cm focus with mixed echogenicity and associated dilation of the upper pole calyx potentially representing pyelonephritis with early/developing acute bacterial nephritis (early abscess not excluded) versus complex cyst.      Recommendations / Plan:  Continue IV-Ceftriaxone.   Will f/u urine cx and adjust abx accordingly. Index bld cxs 09/27 NGTD.   Recommend urology consult for eval and review imaging c/f Rt pyelo with possible early abscess formation.  Anticipate abx duration of 14d      -- Discussed with ID staff and primary team   -- ID will continue to follow w/ further recs.        Thank you for your  consult. I will follow-up with patient. Please contact us if you have any additional questions.    Jacob Griffin PA-C  Infectious Disease  [facility] - Internal Medicine Telemetry    Subjective:     Principal Problem: Pyelonephritis    HPI: 19F, with unremarkable PMH - admitted 09/27 for pyel, with subjective fevers / chills, and Rt flank pain.   UA wbc 16, few bacteria; Ucx +GNR. Index bld cxs NGTD.  Pt maintained on Iv-ceftriaxone. Remains AF, VSS, wbc nml.   CT-a/p: revealed Ill-defined area of hypodensity extending from the central kidney to the periphery with some extension beyond the renal parenchyma; c/f Rt sided pyelo with phlegmonous change or possible early abscess formation. No stones or hydropnephrosis.   F/u RP-U/S showed similar findings: Right-sided upper pole 1.9 cm focus with mixed echogenicity and associated dilation of the upper pole calyx potentially representing pyelonephritis with early/developing acute bacterial nephritis (early abscess not excluded) versus complex cyst.        History reviewed. No pertinent past medical history.    History reviewed. No pertinent surgical history.    Review of patient's allergies indicates:   Allergen Reactions    Cat/feline products Hives       Medications:  Facility-Administered Medications Prior to Admission   Medication    medroxyPROGESTERone (DEPO-PROVERA) syringe 150 mg     Medications Prior to Admission   Medication Sig    boric acid 600 mg Supp Place 1 suppository vaginally every 7 days.    cranberry fruit (CRANBERRY) 450 mg Tab Take 1 tablet by mouth Daily.    medroxyPROGESTERone (DEPO-PROVERA) 150 mg/mL Syrg Inject 1 mL (150 mg total) into the muscle every 3 (three) months.    TURMERIC ORAL Take 1 capsule by mouth Daily.     Antibiotics (From admission, onward)      Start     Stop Route Frequency Ordered    09/28/24 1200  cefTRIAXone (Rocephin) 1 g in D5W 100 mL IVPB (MB+)         -- IV Every 24 hours (non-standard times) 09/27/24 9710           Antifungals (From admission, onward)      None          Antivirals (From admission, onward)      None               There is no immunization history on file for this patient.    Family History    None       Social History     Socioeconomic History    Marital status:    Tobacco Use    Smoking status: Never    Smokeless tobacco: Never   Substance and Sexual Activity    Alcohol use: Never    Drug use: Not Currently     Types: Marijuana     Comment: last used 06/2023 per pt    Sexual activity: Yes     Partners: Male     Social Drivers of Health     Financial Resource Strain: Low Risk  (9/27/2024)    Overall Financial Resource Strain (CARDIA)     Difficulty of Paying Living Expenses: Not very hard   Food Insecurity: No Food Insecurity (9/27/2024)    Hunger Vital Sign     Worried About Running Out of Food in the Last Year: Never true     Ran Out of Food in the Last Year: Never true   Transportation Needs: No Transportation Needs (9/27/2024)    TRANSPORTATION NEEDS     Transportation : No   Physical Activity: Inactive (9/27/2024)    Exercise Vital Sign     Days of Exercise per Week: 0 days     Minutes of Exercise per Session: 0 min   Stress: No Stress Concern Present (9/27/2024)    [employer] - Occupational Stress Questionnaire     Feeling of Stress : Only a little   Housing Stability: Low Risk  (9/27/2024)    Housing Stability Vital Sign     Unable to Pay for Housing in the Last Year: No     Homeless in the Last Year: No     Review of Systems   Constitutional:  Positive for diaphoresis.   Genitourinary:  Positive for flank pain and pelvic pain.   All other systems reviewed and are negative.    Objective:     Vital Signs (Most Recent):  Temp: 97.9 °F (36.6 °C) (09/29/24 1116)  Pulse: 72 (09/29/24 1116)  Resp: 18 (09/29/24 1116)  BP: (!) 91/53 (09/29/24 1116)  SpO2: 98 % (09/29/24 1116) Vital Signs (24h Range):  Temp:  [97.9 °F (36.6 °C)-98.8 °F (37.1 °C)] 97.9 °F (36.6 °C)  Pulse:   [72-99] 72  Resp:  [18-20] 18  SpO2:  [85 %-100 %] 98 %  BP: ()/(53-71) 91/53     Weight: 52.2 kg (115 lb)  Body mass index is 21.73 kg/m².    Estimated Creatinine Clearance: 112.9 mL/min (based on SCr of 0.6 mg/dL).     Physical Exam  Vitals and nursing note reviewed.   Constitutional:       General: She is not in acute distress.     Appearance: She is not ill-appearing, toxic-appearing or diaphoretic.   HENT:      Mouth/Throat:      Pharynx: No oropharyngeal exudate.   Eyes:      General: No scleral icterus.     Conjunctiva/sclera: Conjunctivae normal.   Cardiovascular:      Rate and Rhythm: Normal rate.      Heart sounds: Normal heart sounds. No murmur heard.  Pulmonary:      Effort: No respiratory distress.      Breath sounds: Normal breath sounds.   Abdominal:      General: Bowel sounds are normal. There is no distension.      Palpations: Abdomen is soft.      Tenderness: There is no abdominal tenderness. There is right CVA tenderness. There is no left CVA tenderness.   Musculoskeletal:         General: No swelling or tenderness.      Cervical back: Neck supple. No tenderness.      Right lower leg: No edema.      Left lower leg: No edema.   Skin:     General: Skin is warm and dry.      Findings: No erythema or rash.   Neurological:      Mental Status: She is alert and oriented to person, place, and time. Mental status is at baseline.   Psychiatric:         Behavior: Behavior normal.         Thought Content: Thought content normal.          Significant Labs: Blood Culture:   Recent Labs   Lab 06/17/24  1306 09/27/24  1506   LABBLOO No growth after 5 days.  No growth after 5 days. No Growth to date  No Growth to date  No Growth to date  No Growth to date     CBC:   Recent Labs   Lab 09/28/24  0521 09/29/24  0402   WBC 10.18 9.83   HGB 11.2* 10.7*   HCT 35.1* 33.4*    174     CMP:   Recent Labs   Lab 09/28/24  0521 09/29/24  0402    137   K 4.1 4.3   * 112*   CO2 21* 17*   GLU 86 86    BUN 7 7   CREATININE 0.7 0.6   CALCIUM 8.8 8.8   ANIONGAP 6* 8     Microbiology Results (last 7 days)       Procedure Component Value Units Date/Time    Blood culture [[id number]] Collected: 09/27/24 1506    Order Status: Completed Specimen: Blood Updated: 09/28/24 1812     Blood Culture, Routine No Growth to date      No Growth to date    Blood culture [[id number]] Collected: 09/27/24 1506    Order Status: Completed Specimen: Blood Updated: 09/28/24 1812     Blood Culture, Routine No Growth to date      No Growth to date    Urine culture [[id number]]  (Abnormal) Collected: 09/27/24 1000    Order Status: Completed Specimen: Urine Updated: 09/28/24 0909     Urine Culture, Routine GRAM NEGATIVE NAYA  >100,000 cfu/ml  Identification and susceptibility pending      Narrative:      Specimen Source->Urine          Urine Culture:   Recent Labs   Lab 06/05/24  1112 07/08/24  1432 09/27/24  1000   LABURIN ESCHERICHIA COLI  >100,000 cfu/ml  * Multiple organisms isolated. None in predominance.  Repeat if  clinically necessary. GRAM NEGATIVE NAYA  >100,000 cfu/ml  Identification and susceptibility pending  *     Urine Studies:   Recent Labs   Lab 09/27/24  1000   COLORU Yellow   APPEARANCEUA Hazy*   PHUR 6.0   SPECGRAV 1.010   PROTEINUA Negative   GLUCUA Negative   KETONESU Negative   BILIRUBINUA Negative   OCCULTUA Negative   NITRITE Positive*   LEUKOCYTESUR 2+*   RBCUA 2   WBCUA 16*   BACTERIA Few*   SQUAMEPITHEL 1     All pertinent labs within the past 24 hours have been reviewed.    Significant Imaging: I have reviewed all pertinent imaging results/findings within the past 24 hours.    I have personally reviewed records / hospital notes from   service and other specialty providers. I have also reviewed CBC, CMP/BMP,  cultures and imaging with my interpretation as documented in my assessment / plan.    Patient is high risk for infectious complications given pt's age, multiple co-morbidities, and case  complexity.      Time: 75 minutes   50% of time spent on face-to-face counseling and coordination of care. Counseling included review of test results, diagnosis, and treatment plan with patient and/or family.

## 2024-09-29 NOTE — HPI
19F, with unremarkable PMH - admitted 09/27 for pyel, with subjective fevers / chills, and Rt flank pain.   UA wbc 16, few bacteria; Ucx +GNR. Index bld cxs NGTD.  Pt maintained on Iv-ceftriaxone. Remains AF, VSS, wbc nml.   CT-a/p: revealed Ill-defined area of hypodensity extending from the central kidney to the periphery with some extension beyond the renal parenchyma; c/f Rt sided pyelo with phlegmonous change or possible early abscess formation. No stones or hydropnephrosis.   F/u RP-U/S showed similar findings: Right-sided upper pole 1.9 cm focus with mixed echogenicity and associated dilation of the upper pole calyx potentially representing pyelonephritis with early/developing acute bacterial nephritis (early abscess not excluded) versus complex cyst.

## 2024-09-29 NOTE — SUBJECTIVE & OBJECTIVE
History reviewed. No pertinent past medical history.    History reviewed. No pertinent surgical history.    Review of patient's allergies indicates:   Allergen Reactions    Cat/feline products Hives       Medications:  Facility-Administered Medications Prior to Admission   Medication    medroxyPROGESTERone (DEPO-PROVERA) syringe 150 mg     Medications Prior to Admission   Medication Sig    boric acid 600 mg Supp Place 1 suppository vaginally every 7 days.    cranberry fruit (CRANBERRY) 450 mg Tab Take 1 tablet by mouth Daily.    medroxyPROGESTERone (DEPO-PROVERA) 150 mg/mL Syrg Inject 1 mL (150 mg total) into the muscle every 3 (three) months.    TURMERIC ORAL Take 1 capsule by mouth Daily.     Antibiotics (From admission, onward)      Start     Stop Route Frequency Ordered    09/28/24 1200  cefTRIAXone (Rocephin) 1 g in D5W 100 mL IVPB (MB+)         -- IV Every 24 hours (non-standard times) 09/27/24 1359          Antifungals (From admission, onward)      None          Antivirals (From admission, onward)      None               There is no immunization history on file for this patient.    Family History    None       Social History     Socioeconomic History    Marital status:    Tobacco Use    Smoking status: Never    Smokeless tobacco: Never   Substance and Sexual Activity    Alcohol use: Never    Drug use: Not Currently     Types: Marijuana     Comment: last used 06/2023 per pt    Sexual activity: Yes     Partners: Male     Social Drivers of Health     Financial Resource Strain: Low Risk  (9/27/2024)    Overall Financial Resource Strain (CARDIA)     Difficulty of Paying Living Expenses: Not very hard   Food Insecurity: No Food Insecurity (9/27/2024)    Hunger Vital Sign     Worried About Running Out of Food in the Last Year: Never true     Ran Out of Food in the Last Year: Never true   Transportation Needs: No Transportation Needs (9/27/2024)    TRANSPORTATION NEEDS     Transportation : No   Physical  Activity: Inactive (9/27/2024)    Exercise Vital Sign     Days of Exercise per Week: 0 days     Minutes of Exercise per Session: 0 min   Stress: No Stress Concern Present (9/27/2024)    [employer] [place] [employer] - Occupational Stress Questionnaire     Feeling of Stress : Only a little   Housing Stability: Low Risk  (9/27/2024)    Housing Stability Vital Sign     Unable to Pay for Housing in the Last Year: No     Homeless in the Last Year: No     Review of Systems   Constitutional:  Positive for diaphoresis.   Genitourinary:  Positive for flank pain and pelvic pain.   All other systems reviewed and are negative.    Objective:     Vital Signs (Most Recent):  Temp: 97.9 °F (36.6 °C) (09/29/24 1116)  Pulse: 72 (09/29/24 1116)  Resp: 18 (09/29/24 1116)  BP: (!) 91/53 (09/29/24 1116)  SpO2: 98 % (09/29/24 1116) Vital Signs (24h Range):  Temp:  [97.9 °F (36.6 °C)-98.8 °F (37.1 °C)] 97.9 °F (36.6 °C)  Pulse:  [72-99] 72  Resp:  [18-20] 18  SpO2:  [85 %-100 %] 98 %  BP: ()/(53-71) 91/53     Weight: 52.2 kg (115 lb)  Body mass index is 21.73 kg/m².    Estimated Creatinine Clearance: 112.9 mL/min (based on SCr of 0.6 mg/dL).     Physical Exam  Vitals and nursing note reviewed.   Constitutional:       General: She is not in acute distress.     Appearance: She is not ill-appearing, toxic-appearing or diaphoretic.   HENT:      Mouth/Throat:      Pharynx: No oropharyngeal exudate.   Eyes:      General: No scleral icterus.     Conjunctiva/sclera: Conjunctivae normal.   Cardiovascular:      Rate and Rhythm: Normal rate.      Heart sounds: Normal heart sounds. No murmur heard.  Pulmonary:      Effort: No respiratory distress.      Breath sounds: Normal breath sounds.   Abdominal:      General: Bowel sounds are normal. There is no distension.      Palpations: Abdomen is soft.      Tenderness: There is no abdominal tenderness. There is right CVA tenderness. There is no left CVA tenderness.   Musculoskeletal:          General: No swelling or tenderness.      Cervical back: Neck supple. No tenderness.      Right lower leg: No edema.      Left lower leg: No edema.   Skin:     General: Skin is warm and dry.      Findings: No erythema or rash.   Neurological:      Mental Status: She is alert and oriented to person, place, and time. Mental status is at baseline.   Psychiatric:         Behavior: Behavior normal.         Thought Content: Thought content normal.          Significant Labs: Blood Culture:   Recent Labs   Lab 06/17/24  1306 09/27/24  1506   LABBLOO No growth after 5 days.  No growth after 5 days. No Growth to date  No Growth to date  No Growth to date  No Growth to date     CBC:   Recent Labs   Lab 09/28/24  0521 09/29/24  0402   WBC 10.18 9.83   HGB 11.2* 10.7*   HCT 35.1* 33.4*    174     CMP:   Recent Labs   Lab 09/28/24  0521 09/29/24  0402    137   K 4.1 4.3   * 112*   CO2 21* 17*   GLU 86 86   BUN 7 7   CREATININE 0.7 0.6   CALCIUM 8.8 8.8   ANIONGAP 6* 8     Microbiology Results (last 7 days)       Procedure Component Value Units Date/Time    Blood culture [[id number]] Collected: 09/27/24 1506    Order Status: Completed Specimen: Blood Updated: 09/28/24 1812     Blood Culture, Routine No Growth to date      No Growth to date    Blood culture [[id number]] Collected: 09/27/24 1506    Order Status: Completed Specimen: Blood Updated: 09/28/24 1812     Blood Culture, Routine No Growth to date      No Growth to date    Urine culture [[id number]]  (Abnormal) Collected: 09/27/24 1000    Order Status: Completed Specimen: Urine Updated: 09/28/24 0909     Urine Culture, Routine GRAM NEGATIVE NAYA  >100,000 cfu/ml  Identification and susceptibility pending      Narrative:      Specimen Source->Urine          Urine Culture:   Recent Labs   Lab 06/05/24  1112 07/08/24  1432 09/27/24  1000   LABURIN ESCHERICHIA COLI  >100,000 cfu/ml  * Multiple organisms isolated. None in predominance.  Repeat if  clinically  necessary. GRAM NEGATIVE NAYA  >100,000 cfu/ml  Identification and susceptibility pending  *     Urine Studies:   Recent Labs   Lab 09/27/24  1000   COLORU Yellow   APPEARANCEUA Hazy*   PHUR 6.0   SPECGRAV 1.010   PROTEINUA Negative   GLUCUA Negative   KETONESU Negative   BILIRUBINUA Negative   OCCULTUA Negative   NITRITE Positive*   LEUKOCYTESUR 2+*   RBCUA 2   WBCUA 16*   BACTERIA Few*   SQUAMEPITHEL 1     All pertinent labs within the past 24 hours have been reviewed.    Significant Imaging: I have reviewed all pertinent imaging results/findings within the past 24 hours.    I have personally reviewed records / hospital notes from   service and other specialty providers. I have also reviewed CBC, CMP/BMP,  cultures and imaging with my interpretation as documented in my assessment / plan.    Patient is high risk for infectious complications given pt's age, multiple co-morbidities, and case complexity.      Time: 75 minutes   50% of time spent on face-to-face counseling and coordination of care. Counseling included review of test results, diagnosis, and treatment plan with patient and/or family.

## 2024-09-29 NOTE — HOSPITAL COURSE
Admitted for pyelonephritis. Started on mIVF & CTX. Afebrile & no leukocytosis but soft BP (90s-100s/60s), but asymptomatic. Improvement of R flank & RUQ pain. Retroperitoneal US showed a 1.9 cm focus of the R upper pole w/ mixed echogenicity & assoc dilation of the upper pole calyx possibly s/o pyelonephritis w/ early/developing acute bacterial nephritis (vs early abscess vs complex cyst). ID & Urology consulted.

## 2024-09-30 VITALS
HEIGHT: 61 IN | RESPIRATION RATE: 18 BRPM | OXYGEN SATURATION: 98 % | HEART RATE: 81 BPM | DIASTOLIC BLOOD PRESSURE: 57 MMHG | SYSTOLIC BLOOD PRESSURE: 97 MMHG | BODY MASS INDEX: 21.71 KG/M2 | TEMPERATURE: 99 F | WEIGHT: 115 LBS

## 2024-09-30 DIAGNOSIS — N15.1 RENAL ABSCESS: Primary | ICD-10-CM

## 2024-09-30 PROBLEM — I95.9 HYPOTENSION: Status: ACTIVE | Noted: 2024-09-30

## 2024-09-30 LAB
ANION GAP SERPL CALC-SCNC: 7 MMOL/L (ref 8–16)
BACTERIA UR CULT: ABNORMAL
BUN SERPL-MCNC: 10 MG/DL (ref 6–20)
CALCIUM SERPL-MCNC: 8.8 MG/DL (ref 8.7–10.5)
CHLORIDE SERPL-SCNC: 113 MMOL/L (ref 95–110)
CO2 SERPL-SCNC: 21 MMOL/L (ref 23–29)
CREAT SERPL-MCNC: 0.7 MG/DL (ref 0.5–1.4)
ERYTHROCYTE [DISTWIDTH] IN BLOOD BY AUTOMATED COUNT: 12.3 % (ref 11.5–14.5)
EST. GFR  (NO RACE VARIABLE): >60 ML/MIN/1.73 M^2
FERRITIN SERPL-MCNC: 82 NG/ML (ref 20–300)
FOLATE SERPL-MCNC: 6.1 NG/ML (ref 4–24)
GLUCOSE SERPL-MCNC: 91 MG/DL (ref 70–110)
HCT VFR BLD AUTO: 34.5 % (ref 37–48.5)
HGB BLD-MCNC: 11 G/DL (ref 12–16)
IRON SERPL-MCNC: 33 UG/DL (ref 30–160)
MAGNESIUM SERPL-MCNC: 1.9 MG/DL (ref 1.6–2.6)
MCH RBC QN AUTO: 26.8 PG (ref 27–31)
MCHC RBC AUTO-ENTMCNC: 31.9 G/DL (ref 32–36)
MCV RBC AUTO: 84 FL (ref 82–98)
PLATELET # BLD AUTO: 190 K/UL (ref 150–450)
PMV BLD AUTO: 10.9 FL (ref 9.2–12.9)
POTASSIUM SERPL-SCNC: 3.9 MMOL/L (ref 3.5–5.1)
RBC # BLD AUTO: 4.11 M/UL (ref 4–5.4)
SATURATED IRON: 12 % (ref 20–50)
SODIUM SERPL-SCNC: 141 MMOL/L (ref 136–145)
TOTAL IRON BINDING CAPACITY: 284 UG/DL (ref 250–450)
TRANSFERRIN SERPL-MCNC: 192 MG/DL (ref 200–375)
VIT B12 SERPL-MCNC: 428 PG/ML (ref 210–950)
WBC # BLD AUTO: 7.53 K/UL (ref 3.9–12.7)

## 2024-09-30 PROCEDURE — 36415 COLL VENOUS BLD VENIPUNCTURE: CPT | Performed by: STUDENT IN AN ORGANIZED HEALTH CARE EDUCATION/TRAINING PROGRAM

## 2024-09-30 PROCEDURE — 85027 COMPLETE CBC AUTOMATED: CPT | Performed by: STUDENT IN AN ORGANIZED HEALTH CARE EDUCATION/TRAINING PROGRAM

## 2024-09-30 PROCEDURE — A4216 STERILE WATER/SALINE, 10 ML: HCPCS | Performed by: PHYSICIAN ASSISTANT

## 2024-09-30 PROCEDURE — 11000001 HC ACUTE MED/SURG PRIVATE ROOM

## 2024-09-30 PROCEDURE — 80048 BASIC METABOLIC PNL TOTAL CA: CPT | Performed by: STUDENT IN AN ORGANIZED HEALTH CARE EDUCATION/TRAINING PROGRAM

## 2024-09-30 PROCEDURE — 83540 ASSAY OF IRON: CPT | Performed by: STUDENT IN AN ORGANIZED HEALTH CARE EDUCATION/TRAINING PROGRAM

## 2024-09-30 PROCEDURE — 63600175 PHARM REV CODE 636 W HCPCS: Performed by: PHYSICIAN ASSISTANT

## 2024-09-30 PROCEDURE — 25000003 PHARM REV CODE 250: Performed by: PHYSICIAN ASSISTANT

## 2024-09-30 PROCEDURE — 82728 ASSAY OF FERRITIN: CPT | Performed by: STUDENT IN AN ORGANIZED HEALTH CARE EDUCATION/TRAINING PROGRAM

## 2024-09-30 PROCEDURE — 82607 VITAMIN B-12: CPT | Performed by: STUDENT IN AN ORGANIZED HEALTH CARE EDUCATION/TRAINING PROGRAM

## 2024-09-30 PROCEDURE — 83735 ASSAY OF MAGNESIUM: CPT | Performed by: STUDENT IN AN ORGANIZED HEALTH CARE EDUCATION/TRAINING PROGRAM

## 2024-09-30 PROCEDURE — 99232 SBSQ HOSP IP/OBS MODERATE 35: CPT | Mod: ,,, | Performed by: PHYSICIAN ASSISTANT

## 2024-09-30 PROCEDURE — 82746 ASSAY OF FOLIC ACID SERUM: CPT | Performed by: STUDENT IN AN ORGANIZED HEALTH CARE EDUCATION/TRAINING PROGRAM

## 2024-09-30 RX ORDER — SULFAMETHOXAZOLE AND TRIMETHOPRIM 800; 160 MG/1; MG/1
1 TABLET ORAL 2 TIMES DAILY
Qty: 26 TABLET | Refills: 0 | Status: SHIPPED | OUTPATIENT
Start: 2024-10-01 | End: 2024-10-14

## 2024-09-30 RX ADMIN — Medication 10 ML: at 02:09

## 2024-09-30 RX ADMIN — KETOROLAC TROMETHAMINE 15 MG: 30 INJECTION, SOLUTION INTRAMUSCULAR; INTRAVENOUS at 04:09

## 2024-09-30 RX ADMIN — CEFTRIAXONE 2 G: 2 INJECTION, POWDER, FOR SOLUTION INTRAMUSCULAR; INTRAVENOUS at 11:09

## 2024-09-30 RX ADMIN — Medication 10 ML: at 05:09

## 2024-09-30 NOTE — SUBJECTIVE & OBJECTIVE
History reviewed. No pertinent past medical history.    History reviewed. No pertinent surgical history.    Review of patient's allergies indicates:   Allergen Reactions    Cat/feline products Hives       Family History    None         Tobacco Use    Smoking status: Never    Smokeless tobacco: Never   Substance and Sexual Activity    Alcohol use: Never    Drug use: Not Currently     Types: Marijuana     Comment: last used 06/2023 per pt    Sexual activity: Yes     Partners: Male           Objective:     Temp:  [97.9 °F (36.6 °C)-98.8 °F (37.1 °C)] 98.5 °F (36.9 °C)  Pulse:  [72-93] 73  Resp:  [18-20] 18  SpO2:  [97 %-100 %] 99 %  BP: ()/(53-71) 92/54  Weight: 52.2 kg (115 lb)  Body mass index is 21.73 kg/m².      Bladder Scan Volume (mL): 0 mL (09/29/24 1926)    Drains       None                    Physical Exam  Constitutional:       General: She is not in acute distress.     Appearance: Normal appearance.   HENT:      Head: Normocephalic and atraumatic.      Nose: Nose normal.   Eyes:      Conjunctiva/sclera: Conjunctivae normal.   Cardiovascular:      Rate and Rhythm: Normal rate.   Pulmonary:      Effort: Pulmonary effort is normal. No respiratory distress.   Abdominal:      General: There is no distension.      Tenderness: There is right CVA tenderness. There is no left CVA tenderness.   Musculoskeletal:         General: No deformity.   Skin:     General: Skin is warm and dry.   Neurological:      General: No focal deficit present.      Mental Status: She is alert.   Psychiatric:         Mood and Affect: Mood normal.         Behavior: Behavior normal.          Significant Labs:    BMP:  Recent Labs   Lab 09/27/24  0838 09/28/24  0521 09/29/24  0402    139 137   K 3.4* 4.1 4.3    112* 112*   CO2 20* 21* 17*   BUN 8 7 7   CREATININE 0.8 0.7 0.6   CALCIUM 9.7 8.8 8.8       CBC:  Recent Labs   Lab 09/27/24  0838 09/28/24  0521 09/29/24  0402   WBC 12.05 10.18 9.83   HGB 13.3 11.2* 10.7*   HCT 41.7  35.1* 33.4*    172 174       All pertinent labs results from the past 24 hours have been reviewed.    Significant Imaging:  All pertinent imaging results/findings from the past 24 hours have been reviewed.

## 2024-09-30 NOTE — PLAN OF CARE
Pt x4, VSS. Bladder scan done at bedside by day shift nurse and 0 ml of urine in bladder. PT currently resting, pt care ongoing.  Problem: Adult Inpatient Plan of Care  Goal: Plan of Care Review  Outcome: Progressing  Goal: Patient-Specific Goal (Individualized)  Outcome: Progressing  Goal: Absence of Hospital-Acquired Illness or Injury  Outcome: Progressing  Goal: Optimal Comfort and Wellbeing  Outcome: Progressing

## 2024-09-30 NOTE — HPI
19yoF with concern for pyelonephritis. Urology consulted. Had right flank pain, nausea, subjective fevers and chills, came to ED yesterday, found to have bacteruria. Normal vitals for patient, labs unremarkable, normal WBC, CT obtained showed small right sided hypodensity, no hydronephrosis, no signs of obstructive uropathy.

## 2024-09-30 NOTE — PLAN OF CARE
Problem: Adult Inpatient Plan of Care  Goal: Plan of Care Review  Outcome: Progressing  Goal: Patient-Specific Goal (Individualized)  Outcome: Progressing  Goal: Absence of Hospital-Acquired Illness or Injury  Outcome: Progressing  Goal: Optimal Comfort and Wellbeing  Outcome: Progressing  Goal: Readiness for Transition of Care  Outcome: Progressing     Problem: Infection  Goal: Absence of Infection Signs and Symptoms  Outcome: Progressing     Problem: Pain Acute  Goal: Optimal Pain Control and Function  Outcome: Progressing

## 2024-09-30 NOTE — ASSESSMENT & PLAN NOTE
19yoF with concern for pyelonephritis     - Unclear etiology of imaging finding, may be nonspecific finding, may be cyst, may be early abscess   - Currently non concerning, no acute urological intervention requires   - Continue treating complicated UTI with two weeks of culture appropriate antibiotics   - Consider reimaging patient if clinically worsens    - Recommend renal US in one month as outpatient

## 2024-09-30 NOTE — DISCHARGE INSTRUCTIONS
- Take the antibiotic, sulfamethoxazole-trimethoprim (aka Bactrim), 2x/day for the next 13 days.   - Follow-up with your PCP in 1-2 weeks.   - A referral has also been placed for you to follow-up with the urologist and get a repeat ultrasound  in 1 month.   - If you start to experience fevers, chills, nausea, vomiting, diarrhea, worsening pain, dizziness, lightheadedness, etc - call your doctor or go to the ER.

## 2024-09-30 NOTE — CONSULTS
[facility] Telemetry  Urology  Consult Note    Patient Name: Hema Riley  MRN: [id number]  Admission Date: 9/27/2024  Hospital Length of Stay: 0   Code Status: Full Code   Attending Provider: Enedelia Forman MD   Consulting Provider: Dale Fernando MD  Primary Care Physician: Elizabeth Guadarrama MD  Principal Problem:Pyelonephritis    Inpatient consult to Urology  Consult performed by: Dale Fernando MD  Consult ordered by: Enedelia Forman MD          Subjective:     HPI:  19yoF with concern for pyelonephritis. Urology consulted. Had right flank pain, nausea, subjective fevers and chills, came to ED yesterday, found to have bacteruria. Normal vitals for patient, labs unremarkable, normal WBC, CT obtained showed small right sided hypodensity, no hydronephrosis, no signs of obstructive uropathy.     History reviewed. No pertinent past medical history.    History reviewed. No pertinent surgical history.    Review of patient's allergies indicates:   Allergen Reactions    Cat/feline products Hives       Family History    None         Tobacco Use    Smoking status: Never    Smokeless tobacco: Never   Substance and Sexual Activity    Alcohol use: Never    Drug use: Not Currently     Types: Marijuana     Comment: last used 06/2023 per pt    Sexual activity: Yes     Partners: Male           Objective:     Temp:  [97.9 °F (36.6 °C)-98.8 °F (37.1 °C)] 98.5 °F (36.9 °C)  Pulse:  [72-93] 73  Resp:  [18-20] 18  SpO2:  [97 %-100 %] 99 %  BP: ()/(53-71) 92/54  Weight: 52.2 kg (115 lb)  Body mass index is 21.73 kg/m².      Bladder Scan Volume (mL): 0 mL (09/29/24 1926)    Drains       None                    Physical Exam  Constitutional:       General: She is not in acute distress.     Appearance: Normal appearance.   HENT:      Head: Normocephalic and atraumatic.      Nose: Nose normal.   Eyes:      Conjunctiva/sclera: Conjunctivae normal.   Cardiovascular:      Rate and Rhythm: Normal rate.    Pulmonary:      Effort: Pulmonary effort is normal. No respiratory distress.   Abdominal:      General: There is no distension.      Tenderness: There is right CVA tenderness. There is no left CVA tenderness.   Musculoskeletal:         General: No deformity.   Skin:     General: Skin is warm and dry.   Neurological:      General: No focal deficit present.      Mental Status: She is alert.   Psychiatric:         Mood and Affect: Mood normal.         Behavior: Behavior normal.          Significant Labs:    BMP:  Recent Labs   Lab 09/27/24  0838 09/28/24  0521 09/29/24  0402    139 137   K 3.4* 4.1 4.3    112* 112*   CO2 20* 21* 17*   BUN 8 7 7   CREATININE 0.8 0.7 0.6   CALCIUM 9.7 8.8 8.8       CBC:  Recent Labs   Lab 09/27/24 0838 09/28/24  0521 09/29/24  0402   WBC 12.05 10.18 9.83   HGB 13.3 11.2* 10.7*   HCT 41.7 35.1* 33.4*    172 174       All pertinent labs results from the past 24 hours have been reviewed.    Significant Imaging:  All pertinent imaging results/findings from the past 24 hours have been reviewed.                    Assessment and Plan:     * Pyelonephritis  19yoF with concern for pyelonephritis     - Unclear etiology of imaging finding, may be nonspecific finding, may be cyst, may be early abscess   - Currently non concerning, no acute urological intervention requires   - Continue treating complicated UTI with two weeks of culture appropriate antibiotics   - Consider reimaging patient if clinically worsens    - Recommend renal US in one month as outpatient         VTE Risk Mitigation (From admission, onward)           Ordered     enoxaparin injection 40 mg  Daily         09/27/24 1359     IP VTE HIGH RISK PATIENT  Once         09/27/24 1359     Place sequential compression device  Until discontinued         09/27/24 1359                    Thank you for your consult. I will sign off. Please contact us if you have any additional questions.    Dale Fernando  MD  Urology  [facility] Tele[facility]

## 2024-09-30 NOTE — ASSESSMENT & PLAN NOTE
Soft BP on admission (100s/60s) w/ dips into 90s. Single BP of 68/35 charted, however pt reports that was taken while she was asleep with her legs crossed. BP rechecked 3 min later and was 96/90. Received IVF boluses (x2) along w/ continuous fluids ranging from 75-200cc/hr, and BP remained 90s-100s/60s. Pt asymptomatic- no dizziness, lightheadedness, HA, chest pain, SOB, etc. MAP stable > 65 w/ no e/o hypoperfusion. Per chart review, BP appears to chronically be soft with BP often 90s/60s at outpatient appts. Low suspicion for sepsis or other shock at this time.   - Check VS Q4H + PRN

## 2024-10-01 ENCOUNTER — PATIENT MESSAGE (OUTPATIENT)
Dept: INFECTIOUS DISEASES | Facility: CLINIC | Age: 19
End: 2024-10-01
Payer: MEDICAID

## 2024-10-01 ENCOUNTER — TELEPHONE (OUTPATIENT)
Dept: INFECTIOUS DISEASES | Facility: CLINIC | Age: 19
End: 2024-10-01
Payer: MEDICAID

## 2024-10-01 NOTE — TELEPHONE ENCOUNTER
----- Message from CHEO Shahid sent at 9/30/2024  9:30 PM CDT -----  Regarding: fu 12 d  FU 12 with repeat renal US - ordered - please schedule      ID  # Pyelonephritis  Erosion ask me this is as a means like you made a decision was but the CT did not work out this is a thinks the bony it may tear repair urinary in any of the left upper lobe Barry with a due to backyard the it and so next time please at least involvement in the process I do not honestly this I would consider but the going to find out whether it has been a temp are new 20,000 dollar back please you can see a 30 degree in the backyard okay I do not want to waste of money on he now tolerate dollars on grass and having eaten and killed go on some of the things about the what happens with the rabbits and more rapid we will do more things to the back that was already turn things up yesterday already did id like he that he needs to leave problem seeing is still making more concerned about making may be unilateral decision that does not I have reviewed hospital notes from  HM service and other specialty providers. I have also reviewed CBC, CMP/BMP,  cultures and imaging with my interpretation as documented.      19F, with unremarkable PMH - admitted 09/27 for pyel, with subjective fevers / chills, and Rt flank pain.   UA wbc 16, few bacteria; Ucx +GNR. Index bld cxs NGTD.  Pt maintained on Iv-ceftriaxone. Remains AF, VSS, wbc nml. Rt flank and abd pain improving.   CT-a/p: revealed Ill-defined area of hypodensity extending from the central kidney to the periphery with some extension beyond the renal parenchyma; c/f Rt sided pyelo with phlegmonous change or possible early abscess formation (not excluded), vs complex cyst.  No stones or hydropnephrosis. F/u RP-U/S also showed: Right-sided upper pole 1.9 cm focus with mixed echogenicity and associated dilation of the upper pole calyx potentially representing pyelonephritis with early/developing  acute bacterial nephritis (early abscess not excluded) versus complex cyst.    9/30/24:  R CVA ttp minimal.  Chills and sweats resolved.  Denies dysuria.  E coli on UCX - sensitive to bactrim.  Reports 75% improved.  Urology rec repeat renal US in 1 month.      Recommendations / Plan:  · Continue IV-Ceftriaxone.   · Plan on DC on bactrim DS 1 po bid x 14d  · Reimage with renal US prior to completing bactrim    -- Discussed with ID staff   -- ID will sign off.

## 2024-10-01 NOTE — TELEPHONE ENCOUNTER
Called pt, no answer. Left voicemail to give the office a call back.  Message sent to pt via [facility] for response.

## 2024-10-01 NOTE — ASSESSMENT & PLAN NOTE
Erosion ask me this is as a means like you made a decision was but the CT did not work out this is a thinks the bony it may tear repair urinary in any of the left upper lobe Barry with a due to backyard the it and so next time please at least involvement in the process I do not honestly this I would consider but the going to find out whether it has been a temp are new 20,000 dollar back please you can see a 30 degree in the backyard okay I do not want to waste of money on he now tolerate dollars on grass and having eaten and killed go on some of the things about the what happens with the rabbits and more rapid we will do more things to the back that was already turn things up yesterday already did id like he that he needs to leave problem seeing is still making more concerned about making may be unilateral decision that does not I have reviewed hospital notes from  HM service and other specialty providers. I have also reviewed CBC, CMP/BMP,  cultures and imaging with my interpretation as documented.      19F, with unremarkable PMH - admitted 09/27 for pyel, with subjective fevers / chills, and Rt flank pain.   UA wbc 16, few bacteria; Ucx +GNR. Index bld cxs NGTD.  Pt maintained on Iv-ceftriaxone. Remains AF, VSS, wbc nml. Rt flank and abd pain improving.   CT-a/p: revealed Ill-defined area of hypodensity extending from the central kidney to the periphery with some extension beyond the renal parenchyma; c/f Rt sided pyelo with phlegmonous change or possible early abscess formation (not excluded), vs complex cyst.  No stones or hydropnephrosis. F/u RP-U/S also showed: Right-sided upper pole 1.9 cm focus with mixed echogenicity and associated dilation of the upper pole calyx potentially representing pyelonephritis with early/developing acute bacterial nephritis (early abscess not excluded) versus complex cyst.    9/30/24:  R CVA ttp minimal.  Chills and sweats resolved.  Denies dysuria.  E coli on UCX - sensitive  to bactrim.  Reports 75% improved.  Urology rec repeat renal US in 1 month.      Recommendations / Plan:  Continue IV-Ceftriaxone.   Plan on DC on bactrim DS 1 po bid x 14d  Reimage with renal US prior to completing bactrim    -- Discussed with ID staff   -- ID will sign off.

## 2024-10-01 NOTE — SUBJECTIVE & OBJECTIVE
Interval History:   No acute events overnight  Afebrile and white blood cell count normal  Feels 75% better  Still with some right side pain  Denies fever, chills, sweating    Review of Systems   Constitutional:  Negative for activity change, chills, diaphoresis and fever.   Respiratory:  Negative for cough, shortness of breath and wheezing.    Cardiovascular:  Negative for chest pain.   Gastrointestinal:  Negative for abdominal pain, constipation, diarrhea, nausea and vomiting.   Genitourinary:  Positive for flank pain (R improved). Negative for dysuria, frequency and urgency.   Neurological:  Negative for dizziness.   Hematological:  Does not bruise/bleed easily.     Objective:     Vital Signs (Most Recent):  Temp: 98.6 °F (37 °C) (09/30/24 1558)  Pulse: 81 (09/30/24 1558)  Resp: 18 (09/30/24 1558)  BP: (!) 97/57 (09/30/24 1558)  SpO2: 98 % (09/30/24 1558) Vital Signs (24h Range):  Temp:  [97.9 °F (36.6 °C)-98.6 °F (37 °C)] 98.6 °F (37 °C)  Pulse:  [69-87] 81  Resp:  [17-18] 18  SpO2:  [97 %-99 %] 98 %  BP: (92-97)/(57-60) 97/57     Weight: 52.2 kg (115 lb)  Body mass index is 21.73 kg/m².    Estimated Creatinine Clearance: 96.7 mL/min (based on SCr of 0.7 mg/dL).     Physical Exam  Constitutional:       General: She is not in acute distress.     Appearance: Normal appearance. She is well-developed. She is not ill-appearing, toxic-appearing or diaphoretic.       HENT:      Head: Normocephalic and atraumatic.   Cardiovascular:      Rate and Rhythm: Normal rate and regular rhythm.      Heart sounds: Normal heart sounds. No murmur heard.     No friction rub. No gallop.   Pulmonary:      Effort: Pulmonary effort is normal. No respiratory distress.      Breath sounds: Normal breath sounds. No wheezing or rales.   Abdominal:      General: Bowel sounds are normal. There is no distension.      Palpations: Abdomen is soft. There is no mass.      Tenderness: There is no abdominal tenderness. There is no guarding or rebound.    Skin:     General: Skin is warm and dry.   Neurological:      Mental Status: She is alert and oriented to person, place, and time.   Psychiatric:         Behavior: Behavior normal.          Significant Labs: Blood Culture:   Recent Labs   Lab 06/17/24  1306 09/27/24  1506   LABBLOO No growth after 5 days.  No growth after 5 days. No Growth to date  No Growth to date  No Growth to date  No Growth to date  No Growth to date  No Growth to date  No Growth to date  No Growth to date     CBC:   Recent Labs   Lab 09/29/24  0402 09/30/24  0336   WBC 9.83 7.53   HGB 10.7* 11.0*   HCT 33.4* 34.5*    190     CMP:   Recent Labs   Lab 09/29/24  0402 09/30/24  0336    141   K 4.3 3.9   * 113*   CO2 17* 21*   GLU 86 91   BUN 7 10   CREATININE 0.6 0.7   CALCIUM 8.8 8.8   ANIONGAP 8 7*     Urine Culture:   Recent Labs   Lab 06/05/24  1112 07/08/24  1432 09/27/24  1000   LABURIN ESCHERICHIA COLI  >100,000 cfu/ml  * Multiple organisms isolated. None in predominance.  Repeat if  clinically necessary. ESCHERICHIA COLI  >100,000 cfu/ml  *     Urine Studies:   Recent Labs   Lab 09/27/24  1000   COLORU Yellow   APPEARANCEUA Hazy*   PHUR 6.0   SPECGRAV 1.010   PROTEINUA Negative   GLUCUA Negative   KETONESU Negative   BILIRUBINUA Negative   OCCULTUA Negative   NITRITE Positive*   LEUKOCYTESUR 2+*   RBCUA 2   WBCUA 16*   BACTERIA Few*   SQUAMEPITHEL 1     All pertinent labs within the past 24 hours have been reviewed.    Significant Imaging: I have reviewed all pertinent imaging results/findings within the past 24 hours.  US Retroperitoneal Complete [[id number]] Resulted: 09/28/24 1308   Order Status: Completed Updated: 09/28/24 1310   Narrative:     EXAMINATION:  US RETROPERITONEAL COMPLETE    CLINICAL HISTORY:  Pyelonephritis w/ possible developing phlegmon abscess;    TECHNIQUE:  Ultrasound of the kidneys and urinary bladder was performed including color flow and Doppler evaluation of the  kidneys.    COMPARISON:  CT 09/27/2024    FINDINGS:  Right kidney: The right kidney measures 12.8 cm. No cortical thinning. No loss of corticomedullary distinction. Resistive index measures 0.73.  1.8 x 1.8 x 1.9 cm focus with ill-defined borders and mixed echogenicity in the upper pole.  No stones.  Upper pole calyx is dilated.    Left kidney: The left kidney measures 10.8 cm. No cortical thinning. No loss of corticomedullary distinction. Resistive index measures 0.67.  No mass. No renal stone. No hydronephrosis.    The bladder is partially distended at the time of scanning and has an unremarkable appearance.   Impression:       Right-sided upper pole 1.9 cm focus with mixed echogenicity and associated dilation of the upper pole calyx potentially representing pyelonephritis with early/developing acute bacterial nephritis (early abscess not excluded) versus complex cyst, similar to findings on CT 09/27/2024.  Clinical correlation and follow-up examination recommended to evaluate for temporal evolution/resolution.    Minimally elevated right-sided renal arterial resistive index is nonspecific and may be related to infection and/or medical renal disease.    Electronically signed by resident: Romel Muro  Date: 09/28/2024  Time: 12:58    Electronically signed by: Ehsan Chavira MD  Date: 09/28/2024  Time: 13:08   CT Abdomen Pelvis With IV Contrast NO Oral Contrast [[id number]] (Abnormal) Resulted: 09/27/24 1159   Order Status: Completed Updated: 09/27/24 1202   Narrative:     EXAMINATION:  CT ABDOMEN PELVIS WITH IV CONTRAST    CLINICAL HISTORY:  RLQ abdominal pain (Age >= 14y);    TECHNIQUE:  Axial images of the abdomen and pelvis were acquired after the use of 75 cc [id number] IV contrast.  Coronal and sagittal reconstructions were also obtained    COMPARISON:  Ultrasound 09/27/2024    FINDINGS:  Thoracic soft tissues: Partially visualized thoracic soft tissues appear unremarkable.    Heart: No pericardial  effusion.    Lungs: Lung bases are clear.  No pleural effusion.    Esophagus: Unremarkable.    Stomach and duodenum: Unremarkable.    Liver: Normal in size and contour.  No focal hepatic lesion.    Gallbladder: No calcified gallstones.    Bile Ducts: No evidence of dilated ducts.    Spleen: Unremarkable.    Pancreas: No mass or peripancreatic fat stranding.    Adrenals: Unremarkable.    Kidneys/Ureters: Ill-defined area of hypodensity extending from the central kidney to the periphery with some extension beyond the renal parenchyma (601-53).  Finding is nonspecific but is concerning for pyelonephritis with phlegmonous change/possible early abscess formation.  Additional small hypodensity in the right kidney, too small to characterize.  No hydronephrosis or nephrolithiasis. No ureteral dilatation.    Bladder: No evidence of wall thickening.    Reproductive organs: Unremarkable.    Bowel/Mesentery: Small bowel is normal in caliber, no evidence of obstruction.  No evidence of inflammation.  Colon appears normal without apparent wall thickening or inflammatory change.  Appendix is normal.    Peritoneum: Trace free fluid in the pelvis.  No free air.    Lymph nodes: No lymphadenopathy.    Abdominal wall:  Unremarkable.    Vasculature: No aneurysm. No significant calcific atherosclerosis.    Bones: No acute fracture. No suspicious osseous lesions.   Impression:       Findings suggestive of pyelonephritis with phlegmonous change/possible early abscess formation, as above.  Recommend further evaluation with ultrasound to assess abnormal area for fluid collection.    Trace free fluid in the pelvis, possibly physiologic.    This report was flagged in Epic as abnormal.    Electronically signed by resident: Nova Vides  Date: 09/27/2024  Time: 11:05    Electronically signed by: Nii Pizarro MD  Date: 09/27/2024  Time: 11:59   US Abdomen Limited [[id number]] Resulted: 09/27/24 1017   Order Status: Completed Updated: 09/27/24  1020   Narrative:     EXAMINATION:  US ABDOMEN LIMITED    CLINICAL HISTORY:  Concern for cholelithiasis/cholecystitis.;    TECHNIQUE:  Limited ultrasound of the right upper quadrant of the abdomen focused on the biliary system was performed.    COMPARISON:  None    FINDINGS:  Pancreas: Visualized portions appear normal.    Gallbladder: No calculi, wall thickening, or pericholecystic fluid.  No sonographic Arreola's sign.    Biliary system: The common duct measures 2 mm. No intrahepatic ductal dilatation.    Miscellaneous: No abnormalities in the visualized liver or right kidney. No ascites.   Impression:       No significant sonographic abnormality.      Electronically signed by: Dimas Reilly  Date: 09/27/2024  Time: 10:17      Imaging History     2024    Date Procedure Name Study Review Link PACS Link Status Accession Number Location   09/28/24 12:42 PM US Retroperitoneal Complete Study Review  Images Final [id number] [facility]   09/27/24 11:02 AM CT Abdomen Pelvis With IV Contrast NO Oral Contrast Study Review  Images Final [id number] [facility]   09/27/24 10:15 AM US Abdomen Limited Study Review  Images Final [id number] [facility]

## 2024-10-01 NOTE — PROGRESS NOTES
[facility] - Internal Medicine Telemetry  Infectious Disease  Progress Note    Patient Name: Hema Riley  MRN: [id number]  Admission Date: 9/27/2024  Length of Stay: 1 days  Attending Physician: No att. providers found  Primary Care Provider: Elizabeth Guadarrama MD    Isolation Status: No active isolations  Assessment/Plan:      ID  * Pyelonephritis  I have reviewed hospital notes from   service and other specialty providers. I have also reviewed CBC, CMP/BMP,  cultures and imaging with my interpretation as documented.      19F, with unremarkable PMH - admitted 09/27 for pyel, with subjective fevers / chills, and Rt flank pain.   UA wbc 16, few bacteria; Ucx +GNR. Index bld cxs NGTD.  Pt maintained on Iv-ceftriaxone. Remains AF, VSS, wbc nml. Rt flank and abd pain improving.   CT-a/p: revealed Ill-defined area of hypodensity extending from the central kidney to the periphery with some extension beyond the renal parenchyma; c/f Rt sided pyelo with phlegmonous change or possible early abscess formation (not excluded), vs complex cyst.  No stones or hydropnephrosis. F/u RP-U/S also showed: Right-sided upper pole 1.9 cm focus with mixed echogenicity and associated dilation of the upper pole calyx potentially representing pyelonephritis with early/developing acute bacterial nephritis (early abscess not excluded) versus complex cyst.    9/30/24:  R CVA ttp minimal.  Chills and sweats resolved.  Denies dysuria.  E coli on UCX - sensitive to bactrim.  Reports 75% improved.  Urology rec repeat renal US in 1 month.      Recommendations / Plan:  Continue IV-Ceftriaxone.   Plan on DC on bactrim DS 1 po bid x 14d  Reimage with renal US prior to completing bactrim    -- Discussed with ID staff   -- ID will sign off.         Anticipated Disposition: tbd    Thank you for your consult. I will sign off. Please contact us if you have any additional questions.    CHEO King  Infectious Disease  [facility] - Internal Medicine  Telemetry    Subjective:     Principal Problem:Pyelonephritis    HPI: 19F, with unremarkable PMH - admitted 09/27 for pyel, with subjective fevers / chills, and Rt flank pain.   UA wbc 16, few bacteria; Ucx +GNR. Index bld cxs NGTD.  Pt maintained on Iv-ceftriaxone. Remains AF, VSS, wbc nml.   CT-a/p: revealed Ill-defined area of hypodensity extending from the central kidney to the periphery with some extension beyond the renal parenchyma; c/f Rt sided pyelo with phlegmonous change or possible early abscess formation. No stones or hydropnephrosis.   F/u RP-U/S showed similar findings: Right-sided upper pole 1.9 cm focus with mixed echogenicity and associated dilation of the upper pole calyx potentially representing pyelonephritis with early/developing acute bacterial nephritis (early abscess not excluded) versus complex cyst.      Interval History:   No acute events overnight  Afebrile and white blood cell count normal  Feels 75% better  Still with some right side pain  Denies fever, chills, sweating    Review of Systems   Constitutional:  Negative for activity change, chills, diaphoresis and fever.   Respiratory:  Negative for cough, shortness of breath and wheezing.    Cardiovascular:  Negative for chest pain.   Gastrointestinal:  Negative for abdominal pain, constipation, diarrhea, nausea and vomiting.   Genitourinary:  Positive for flank pain (R improved). Negative for dysuria, frequency and urgency.   Neurological:  Negative for dizziness.   Hematological:  Does not bruise/bleed easily.     Objective:     Vital Signs (Most Recent):  Temp: 98.6 °F (37 °C) (09/30/24 1558)  Pulse: 81 (09/30/24 1558)  Resp: 18 (09/30/24 1558)  BP: (!) 97/57 (09/30/24 1558)  SpO2: 98 % (09/30/24 1558) Vital Signs (24h Range):  Temp:  [97.9 °F (36.6 °C)-98.6 °F (37 °C)] 98.6 °F (37 °C)  Pulse:  [69-87] 81  Resp:  [17-18] 18  SpO2:  [97 %-99 %] 98 %  BP: (92-97)/(57-60) 97/57     Weight: 52.2 kg (115 lb)  Body mass index is 21.73  kg/m².    Estimated Creatinine Clearance: 96.7 mL/min (based on SCr of 0.7 mg/dL).     Physical Exam  Constitutional:       General: She is not in acute distress.     Appearance: Normal appearance. She is well-developed. She is not ill-appearing, toxic-appearing or diaphoretic.       HENT:      Head: Normocephalic and atraumatic.   Cardiovascular:      Rate and Rhythm: Normal rate and regular rhythm.      Heart sounds: Normal heart sounds. No murmur heard.     No friction rub. No gallop.   Pulmonary:      Effort: Pulmonary effort is normal. No respiratory distress.      Breath sounds: Normal breath sounds. No wheezing or rales.   Abdominal:      General: Bowel sounds are normal. There is no distension.      Palpations: Abdomen is soft. There is no mass.      Tenderness: There is no abdominal tenderness. There is no guarding or rebound.   Skin:     General: Skin is warm and dry.   Neurological:      Mental Status: She is alert and oriented to person, place, and time.   Psychiatric:         Behavior: Behavior normal.          Significant Labs: Blood Culture:   Recent Labs   Lab 06/17/24  1306 09/27/24  1506   LABBLOO No growth after 5 days.  No growth after 5 days. No Growth to date  No Growth to date  No Growth to date  No Growth to date  No Growth to date  No Growth to date  No Growth to date  No Growth to date     CBC:   Recent Labs   Lab 09/29/24  0402 09/30/24  0336   WBC 9.83 7.53   HGB 10.7* 11.0*   HCT 33.4* 34.5*    190     CMP:   Recent Labs   Lab 09/29/24  0402 09/30/24  0336    141   K 4.3 3.9   * 113*   CO2 17* 21*   GLU 86 91   BUN 7 10   CREATININE 0.6 0.7   CALCIUM 8.8 8.8   ANIONGAP 8 7*     Urine Culture:   Recent Labs   Lab 06/05/24  1112 07/08/24  1432 09/27/24  1000   LABURIN ESCHERICHIA COLI  >100,000 cfu/ml  * Multiple organisms isolated. None in predominance.  Repeat if  clinically necessary. ESCHERICHIA COLI  >100,000 cfu/ml  *     Urine Studies:   Recent Labs   Lab  09/27/24  1000   COLORU Yellow   APPEARANCEUA Hazy*   PHUR 6.0   SPECGRAV 1.010   PROTEINUA Negative   GLUCUA Negative   KETONESU Negative   BILIRUBINUA Negative   OCCULTUA Negative   NITRITE Positive*   LEUKOCYTESUR 2+*   RBCUA 2   WBCUA 16*   BACTERIA Few*   SQUAMEPITHEL 1     All pertinent labs within the past 24 hours have been reviewed.    Significant Imaging: I have reviewed all pertinent imaging results/findings within the past 24 hours.  US Retroperitoneal Complete [[id number]] Resulted: 09/28/24 1308   Order Status: Completed Updated: 09/28/24 1310   Narrative:     EXAMINATION:  US RETROPERITONEAL COMPLETE    CLINICAL HISTORY:  Pyelonephritis w/ possible developing phlegmon abscess;    TECHNIQUE:  Ultrasound of the kidneys and urinary bladder was performed including color flow and Doppler evaluation of the kidneys.    COMPARISON:  CT 09/27/2024    FINDINGS:  Right kidney: The right kidney measures 12.8 cm. No cortical thinning. No loss of corticomedullary distinction. Resistive index measures 0.73.  1.8 x 1.8 x 1.9 cm focus with ill-defined borders and mixed echogenicity in the upper pole.  No stones.  Upper pole calyx is dilated.    Left kidney: The left kidney measures 10.8 cm. No cortical thinning. No loss of corticomedullary distinction. Resistive index measures 0.67.  No mass. No renal stone. No hydronephrosis.    The bladder is partially distended at the time of scanning and has an unremarkable appearance.   Impression:       Right-sided upper pole 1.9 cm focus with mixed echogenicity and associated dilation of the upper pole calyx potentially representing pyelonephritis with early/developing acute bacterial nephritis (early abscess not excluded) versus complex cyst, similar to findings on CT 09/27/2024.  Clinical correlation and follow-up examination recommended to evaluate for temporal evolution/resolution.    Minimally elevated right-sided renal arterial resistive index is nonspecific and may be  related to infection and/or medical renal disease.    Electronically signed by resident: Romel Muro  Date: 09/28/2024  Time: 12:58    Electronically signed by: Ehsan Chavira MD  Date: 09/28/2024  Time: 13:08   CT Abdomen Pelvis With IV Contrast NO Oral Contrast [[id number]] (Abnormal) Resulted: 09/27/24 1159   Order Status: Completed Updated: 09/27/24 1202   Narrative:     EXAMINATION:  CT ABDOMEN PELVIS WITH IV CONTRAST    CLINICAL HISTORY:  RLQ abdominal pain (Age >= 14y);    TECHNIQUE:  Axial images of the abdomen and pelvis were acquired after the use of 75 cc [id number] IV contrast.  Coronal and sagittal reconstructions were also obtained    COMPARISON:  Ultrasound 09/27/2024    FINDINGS:  Thoracic soft tissues: Partially visualized thoracic soft tissues appear unremarkable.    Heart: No pericardial effusion.    Lungs: Lung bases are clear.  No pleural effusion.    Esophagus: Unremarkable.    Stomach and duodenum: Unremarkable.    Liver: Normal in size and contour.  No focal hepatic lesion.    Gallbladder: No calcified gallstones.    Bile Ducts: No evidence of dilated ducts.    Spleen: Unremarkable.    Pancreas: No mass or peripancreatic fat stranding.    Adrenals: Unremarkable.    Kidneys/Ureters: Ill-defined area of hypodensity extending from the central kidney to the periphery with some extension beyond the renal parenchyma (601-53).  Finding is nonspecific but is concerning for pyelonephritis with phlegmonous change/possible early abscess formation.  Additional small hypodensity in the right kidney, too small to characterize.  No hydronephrosis or nephrolithiasis. No ureteral dilatation.    Bladder: No evidence of wall thickening.    Reproductive organs: Unremarkable.    Bowel/Mesentery: Small bowel is normal in caliber, no evidence of obstruction.  No evidence of inflammation.  Colon appears normal without apparent wall thickening or inflammatory change.  Appendix is normal.    Peritoneum: Trace free  fluid in the pelvis.  No free air.    Lymph nodes: No lymphadenopathy.    Abdominal wall:  Unremarkable.    Vasculature: No aneurysm. No significant calcific atherosclerosis.    Bones: No acute fracture. No suspicious osseous lesions.   Impression:       Findings suggestive of pyelonephritis with phlegmonous change/possible early abscess formation, as above.  Recommend further evaluation with ultrasound to assess abnormal area for fluid collection.    Trace free fluid in the pelvis, possibly physiologic.    This report was flagged in Epic as abnormal.    Electronically signed by resident: Nova Vides  Date: 09/27/2024  Time: 11:05    Electronically signed by: Nii Pizarro MD  Date: 09/27/2024  Time: 11:59   US Abdomen Limited [[id number]] Resulted: 09/27/24 1017   Order Status: Completed Updated: 09/27/24 1020   Narrative:     EXAMINATION:  US ABDOMEN LIMITED    CLINICAL HISTORY:  Concern for cholelithiasis/cholecystitis.;    TECHNIQUE:  Limited ultrasound of the right upper quadrant of the abdomen focused on the biliary system was performed.    COMPARISON:  None    FINDINGS:  Pancreas: Visualized portions appear normal.    Gallbladder: No calculi, wall thickening, or pericholecystic fluid.  No sonographic Arreola's sign.    Biliary system: The common duct measures 2 mm. No intrahepatic ductal dilatation.    Miscellaneous: No abnormalities in the visualized liver or right kidney. No ascites.   Impression:       No significant sonographic abnormality.      Electronically signed by: Dimas Reilly  Date: 09/27/2024  Time: 10:17      Imaging History     2024    Date Procedure Name Study Review Link PACS Link Status Accession Number Location   09/28/24 12:42 PM US Retroperitoneal Complete Study Review  Images Final [id number] [facility]   09/27/24 11:02 AM CT Abdomen Pelvis With IV Contrast NO Oral Contrast Study Review  Images Final [id number] [facility]   09/27/24 10:15 AM US Abdomen Limited Study Review  Images Final [id number] [facility]

## 2024-10-01 NOTE — PLAN OF CARE
[facility] - Internal Medicine Telemetry  Discharge Final Note    Primary Care Provider: Elizabeth Guadarrama MD    Expected Discharge Date: 9/30/2024    Final Discharge Note (most recent)       Final Note - 10/01/24 0848          Final Note    Assessment Type Final Discharge Note (P)      Anticipated Discharge Disposition Home or Self Care (P)      What phone number can be called within the next 1-3 days to see how you are doing after discharge? [phone] (P)         Post-Acute Status    Post-Acute Authorization Other (P)      Other Status Awaiting f/u Appts (P)                    DEC    9 Injection  Monday Dec 9, 2024 8:40 AM [facility]  [address]  [address]  [place] [phone]     Important Message from Medicare             Patient discharged home w/ family via personal vehicle.                  NOY Winkler, LMSW  [facility]  Case Management  Ext. [phone]

## 2024-10-02 LAB
BACTERIA BLD CULT: NORMAL
BACTERIA BLD CULT: NORMAL

## 2024-10-08 ENCOUNTER — TELEPHONE (OUTPATIENT)
Dept: INFECTIOUS DISEASES | Facility: CLINIC | Age: 19
End: 2024-10-08
Payer: MEDICAID

## 2024-10-08 NOTE — TELEPHONE ENCOUNTER
----- Message from Tomasa sent at 10/8/2024 11:04 AM CDT -----  Regarding: PT ADVICE  Contact: [phone]  Pt is returning a missed call from someone in the office and is asking for a return call back soon. Thanks.     Reason for call:MISS CALL      Patient's DX:     Patient requesting call back or [facility] msg: [phone]

## 2024-10-15 ENCOUNTER — HOSPITAL ENCOUNTER (OUTPATIENT)
Dept: RADIOLOGY | Facility: HOSPITAL | Age: 19
Discharge: HOME | End: 2024-10-15
Attending: PHYSICIAN ASSISTANT
Payer: MEDICAID

## 2024-10-15 ENCOUNTER — OFFICE VISIT (OUTPATIENT)
Dept: INFECTIOUS DISEASES | Facility: CLINIC | Age: 19
End: 2024-10-15
Payer: MEDICAID

## 2024-10-15 VITALS
DIASTOLIC BLOOD PRESSURE: 67 MMHG | TEMPERATURE: 98 F | HEART RATE: 89 BPM | SYSTOLIC BLOOD PRESSURE: 99 MMHG | WEIGHT: 119.5 LBS | BODY MASS INDEX: 22.56 KG/M2 | HEIGHT: 61 IN

## 2024-10-15 DIAGNOSIS — N12 PYELONEPHRITIS: Primary | ICD-10-CM

## 2024-10-15 DIAGNOSIS — N15.1 RENAL ABSCESS: ICD-10-CM

## 2024-10-15 PROCEDURE — 3074F SYST BP LT 130 MM HG: CPT | Mod: CPTII,,, | Performed by: PHYSICIAN ASSISTANT

## 2024-10-15 PROCEDURE — 99213 OFFICE O/P EST LOW 20 MIN: CPT | Mod: S$PBB,,, | Performed by: PHYSICIAN ASSISTANT

## 2024-10-15 PROCEDURE — 1159F MED LIST DOCD IN RCRD: CPT | Mod: CPTII,,, | Performed by: PHYSICIAN ASSISTANT

## 2024-10-15 PROCEDURE — 1160F RVW MEDS BY RX/DR IN RCRD: CPT | Mod: CPTII,,, | Performed by: PHYSICIAN ASSISTANT

## 2024-10-15 PROCEDURE — 3008F BODY MASS INDEX DOCD: CPT | Mod: CPTII,,, | Performed by: PHYSICIAN ASSISTANT

## 2024-10-15 PROCEDURE — 99213 OFFICE O/P EST LOW 20 MIN: CPT | Mod: PBBFAC,25 | Performed by: PHYSICIAN ASSISTANT

## 2024-10-15 PROCEDURE — 3078F DIAST BP <80 MM HG: CPT | Mod: CPTII,,, | Performed by: PHYSICIAN ASSISTANT

## 2024-10-15 PROCEDURE — 99999 PR PBB SHADOW E&M-EST. PATIENT-LVL III: CPT | Mod: PBBFAC,,, | Performed by: PHYSICIAN ASSISTANT

## 2024-10-15 PROCEDURE — 76770 US EXAM ABDO BACK WALL COMP: CPT | Mod: TC

## 2024-10-15 PROCEDURE — 76770 US EXAM ABDO BACK WALL COMP: CPT | Mod: 26,,, | Performed by: RADIOLOGY

## 2024-10-15 PROCEDURE — 1111F DSCHRG MED/CURRENT MED MERGE: CPT | Mod: CPTII,,, | Performed by: PHYSICIAN ASSISTANT

## 2024-10-15 NOTE — PROGRESS NOTES
Subjective     Patient ID: Hema Riley is a 19 y.o. female.    Chief Complaint: Follow-up    19F recently admitted with unremarkable PMH - admitted 09/27 for pyel, with subjective fevers / chills, and Rt flank pain.   UA wbc 16, few bacteria; Ucx showed GNR. Bld cxs NGTD.  Pt maintained on Iv-ceftriaxone. Rt flank and abd pain improved during hospital stay.    CT-a/p: revealed Ill-defined area of hypodensity extending from the central kidney to the periphery with some extension beyond the renal parenchyma; c/f Rt sided pyelo with phlegmonous change or possible early abscess formation (not excluded), vs complex cyst.  No stones or hydropnephrosis. F/u RP-U/S also showed: Right-sided upper pole 1.9 cm focus with mixed echogenicity and associated dilation of the upper pole calyx potentially representing pyelonephritis with early/developing acute bacterial nephritis (early abscess not excluded) versus complex cyst.    Urine cultures grew E coli and was treated with rocephin.  She was dc'd on Bactrim.  Give cf renal abscess repeat US ordered to be done later today.  Patient reports feeling much better and all symptoms resolved.  Tolerated the Bactrim without issue and completed the course yesterday.  She denies any fevers chills or sweats.  She is on the hypotensive side today with a BP of 99/67 but denies any weakness dizziness lightheadedness.  She is seen at this time            Review of Systems   Constitutional:  Positive for appetite change, chills, diaphoresis, fatigue and fever. Negative for unexpected weight change.   HENT:  Positive for ear pain and sore throat. Negative for nasal congestion, hearing loss and tinnitus.    Eyes:  Negative for pain, redness and visual disturbance.   Respiratory:  Negative for cough, chest tightness, shortness of breath and wheezing.    Cardiovascular:  Negative for chest pain.   Gastrointestinal:  Negative for abdominal pain, constipation, diarrhea, nausea and vomiting.    Endocrine: Negative for cold intolerance and heat intolerance.   Genitourinary:  Negative for decreased urine volume, difficulty urinating, dysuria, flank pain, frequency, hematuria and urgency.   Musculoskeletal:  Negative for arthralgias, back pain, myalgias and neck pain.   Integumentary:  Negative for rash and wound.   Allergic/Immunologic: Negative for environmental allergies, food allergies and immunocompromised state.   Neurological:  Negative for dizziness, facial asymmetry, weakness, light-headedness, numbness and headaches.   Hematological:  Negative for adenopathy. Does not bruise/bleed easily.   Psychiatric/Behavioral:  Negative for agitation, behavioral problems and confusion.           Objective     Physical Exam  Constitutional:       General: She is not in acute distress.     Appearance: Normal appearance. She is well-developed. She is not diaphoretic.       HENT:      Head: Normocephalic and atraumatic.   Cardiovascular:      Rate and Rhythm: Normal rate and regular rhythm.      Heart sounds: Normal heart sounds. No murmur heard.     No friction rub. No gallop.   Pulmonary:      Effort: Pulmonary effort is normal. No respiratory distress.      Breath sounds: Normal breath sounds. No wheezing or rales.   Abdominal:      General: Bowel sounds are normal. There is no distension.      Palpations: Abdomen is soft. There is no mass.      Tenderness: There is no abdominal tenderness. There is no guarding or rebound.   Skin:     General: Skin is warm and dry.   Neurological:      Mental Status: She is alert and oriented to person, place, and time.   Psychiatric:         Behavior: Behavior normal.            Assessment and Plan     1. Pyelonephritis    19-year-old female recently seen as inpatient with concern for UTI and pyelonephritis.  Urine culture grew E coli and blood cultures were negative.  She was treated with ceftriaxone as inpatient and discharged to complete 2 weeks of Bactrim which he has  done.  CT as inpatient was concerning for possible renal abscess.  She is doing better and all of her symptoms have resolved.  She did have a recent upper respiratory infection which gave her subjective fevers and chills but that is resolved.  She is on she is on the hypotensive side today but is asymptomatic.  Suspect she has been adequately treated but she does have an upcoming renal ultrasound today to ensure no abnormal findings.  She has completed the Bactrim yesterday.    Plan:  Monitor off antibiotic  Follow up ultrasound and if there are any concerning findings can re-evaluate at that time.  We will notify patient of the ultrasound results  Provided business card and instructed to contact me for any concerns or complaints.  Follow up p.r.n. at this point             No follow-ups on file.